# Patient Record
Sex: FEMALE | Race: WHITE | NOT HISPANIC OR LATINO | Employment: FULL TIME | URBAN - METROPOLITAN AREA
[De-identification: names, ages, dates, MRNs, and addresses within clinical notes are randomized per-mention and may not be internally consistent; named-entity substitution may affect disease eponyms.]

---
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
## 2020-01-28 ENCOUNTER — HOSPITAL ENCOUNTER (OUTPATIENT)
Dept: RADIOLOGY | Facility: MEDICAL CENTER | Age: 20
End: 2020-01-28
Attending: NURSE PRACTITIONER
Payer: COMMERCIAL

## 2020-01-28 ENCOUNTER — OFFICE VISIT (OUTPATIENT)
Dept: URGENT CARE | Facility: PHYSICIAN GROUP | Age: 20
End: 2020-01-28
Payer: COMMERCIAL

## 2020-01-28 VITALS
RESPIRATION RATE: 12 BRPM | BODY MASS INDEX: 33.21 KG/M2 | HEIGHT: 70 IN | OXYGEN SATURATION: 97 % | TEMPERATURE: 98.3 F | SYSTOLIC BLOOD PRESSURE: 94 MMHG | HEART RATE: 80 BPM | DIASTOLIC BLOOD PRESSURE: 66 MMHG | WEIGHT: 232 LBS

## 2020-01-28 DIAGNOSIS — N20.0 KIDNEY STONE: ICD-10-CM

## 2020-01-28 DIAGNOSIS — R30.0 DYSURIA: ICD-10-CM

## 2020-01-28 DIAGNOSIS — R31.9 HEMATURIA, UNSPECIFIED TYPE: ICD-10-CM

## 2020-01-28 LAB
APPEARANCE UR: NORMAL
BILIRUB UR STRIP-MCNC: NORMAL MG/DL
COLOR UR AUTO: NORMAL
GLUCOSE UR STRIP.AUTO-MCNC: NEGATIVE MG/DL
INT CON NEG: NEGATIVE
INT CON POS: POSITIVE
KETONES UR STRIP.AUTO-MCNC: NORMAL MG/DL
LEUKOCYTE ESTERASE UR QL STRIP.AUTO: NEGATIVE
NITRITE UR QL STRIP.AUTO: NEGATIVE
PH UR STRIP.AUTO: 5.5 [PH] (ref 5–8)
POC URINE PREGNANCY TEST: NEGATIVE
PROT UR QL STRIP: 300 MG/DL
RBC UR QL AUTO: NORMAL
SP GR UR STRIP.AUTO: 1.03
UROBILINOGEN UR STRIP-MCNC: 2 MG/DL

## 2020-01-28 PROCEDURE — 74176 CT ABD & PELVIS W/O CONTRAST: CPT

## 2020-01-28 PROCEDURE — 81025 URINE PREGNANCY TEST: CPT | Performed by: NURSE PRACTITIONER

## 2020-01-28 PROCEDURE — 81002 URINALYSIS NONAUTO W/O SCOPE: CPT | Performed by: NURSE PRACTITIONER

## 2020-01-28 PROCEDURE — 99214 OFFICE O/P EST MOD 30 MIN: CPT | Performed by: NURSE PRACTITIONER

## 2020-01-28 RX ORDER — TAMSULOSIN HYDROCHLORIDE 0.4 MG/1
0.4 CAPSULE ORAL
Qty: 14 CAP | Refills: 0 | Status: SHIPPED | OUTPATIENT
Start: 2020-01-28 | End: 2021-01-03

## 2020-01-28 RX ORDER — IBUPROFEN 600 MG/1
600 TABLET ORAL EVERY 6 HOURS PRN
COMMUNITY
End: 2020-01-28

## 2020-01-28 RX ORDER — KETOROLAC TROMETHAMINE 30 MG/ML
30 INJECTION, SOLUTION INTRAMUSCULAR; INTRAVENOUS ONCE
Status: COMPLETED | OUTPATIENT
Start: 2020-01-28 | End: 2020-01-28

## 2020-01-28 RX ADMIN — KETOROLAC TROMETHAMINE 30 MG: 30 INJECTION, SOLUTION INTRAMUSCULAR; INTRAVENOUS at 17:02

## 2020-01-28 NOTE — LETTER
January 28, 2020       Patient: Elizabeth Delgado   YOB: 2000   Date of Visit: 1/28/2020         To Whom It May Concern:    It is my medical opinion that Elizabeth Delgado return to full duty, no restrictions..              Sincerely,          DUDLEY Maradiaga  Electronically Signed

## 2020-01-28 NOTE — PROGRESS NOTES
"Subjective:      Elizabeth Delgado is a 19 y.o. female who presents with Vaginal Pain (color change in urine, vaginal pain, blood in urine, dysuria, bloating, loss of appetite, weakness, vomiting yesterday, concerned for tubal pregnancy)    Reviewed past medical, surgical and family history. Reviewed prescription and OTC medications with patient in electronic health record today      No Known Allergies          HPI This is a new problem.  C/o abd discomfort and bloating.   No brith control for awhile ( high dose OCP) . 1 year without period. Last menstrual cycle 1- to 1-. Then felt like she had UTI around 1-24-20. Increased bloating and lower abd discomfort. Assoc Sx: weakness, fatigue, dysuria, vomited once yesterday.   Treatment tried; increased fluids, cranberry juice. No other aggravating or alleviating factors.       Review of Systems   Constitutional: Negative for chills, fever and malaise/fatigue.   Respiratory: Negative for cough.    Gastrointestinal: Negative for abdominal pain, constipation, diarrhea and vomiting.        See HPI     Genitourinary: Negative for dysuria, flank pain, frequency, hematuria and urgency.   Musculoskeletal: Negative for back pain.   Neurological: Negative for tingling and weakness.   Psychiatric/Behavioral: Negative for substance abuse.          Objective:     BP (!) 94/66   Pulse 80   Temp 36.8 °C (98.3 °F)   Resp 12   Ht 1.778 m (5' 10\")   Wt 105.2 kg (232 lb)   LMP 01/22/2020 (Exact Date)   SpO2 97%   Breastfeeding? Unknown   BMI 33.29 kg/m²      Physical Exam  Vitals signs and nursing note reviewed.   Constitutional:       General: She is not in acute distress.     Appearance: Normal appearance. She is well-developed. She is not ill-appearing, toxic-appearing or diaphoretic.   HENT:      Head: Normocephalic.      Mouth/Throat:      Mouth: Mucous membranes are moist.   Eyes:      Pupils: Pupils are equal, round, and reactive to light.   Cardiovascular:     "  Rate and Rhythm: Normal rate and regular rhythm.   Pulmonary:      Effort: Pulmonary effort is normal.   Abdominal:      General: Bowel sounds are normal. There is no distension.      Palpations: Abdomen is soft. Abdomen is not rigid.      Tenderness: There is generalized tenderness. There is no right CVA tenderness, left CVA tenderness, guarding or rebound. Negative signs include Henderson's sign, Rovsing's sign, McBurney's sign, psoas sign and obturator sign.   Musculoskeletal:      Lumbar back: Normal.   Skin:     General: Skin is warm and dry.      Capillary Refill: Capillary refill takes less than 2 seconds.   Neurological:      General: No focal deficit present.      Mental Status: She is alert and oriented to person, place, and time.   Psychiatric:         Mood and Affect: Mood normal.         Behavior: Behavior normal. Behavior is cooperative.         Thought Content: Thought content normal.                1/28/2020 4:04 PM     HISTORY/REASON FOR EXAM:  Hematuria and dysuria for 2 months.     TECHNIQUE/EXAM DESCRIPTION AND NUMBER OF VIEWS: CT scan renal/colic without contrast.     5 mm helical images of the abdomen and pelvis were obtained from the diaphragmatic domes through the pubic symphysis.     Low dose optimization technique was utilized for this CT exam including automated exposure control and adjustment of the mA and/or kV according to patient size.     COMPARISON:     FINDINGS:    The visualized lung bases are clear.     The liver, spleen, pancreas, and adrenal glands are normal in noncontrast appearance.  The gallbladder is normal.  Both kidneys are without hydronephrosis. There is a single 1 mm punctate stone in the lower pole of the left kidney.  No ureteral dilatation or ureterolithiasis is identified.  No perinephric fluid collection is present.  No adrenal enlargement is present.  The aorta and IVC are normal in caliber.     The bowel and mesentery are normal.  The appendix appears  normal.     No free fluid or fluid collections are present.     There is no abdominal or pelvic adenopathy.     Evaluation of the pelvis shows no mass, inflammation, or other significant abnormalities.  The bladder is minimally distended.  The uterus and ovaries appear normal.     Please note that noncontrast evaluation will not detect many abnormalities of the solid organs, bowel, or peritoneal cavity.     IMPRESSION:        1.  Single 1 mm stone in the lower pole the left kidney. No right nephrolithiasis.     2.  No hydronephrosis or ureterolithiasis.     3.  No inflammatory change of the kidneys identified.     4.  No evidence of bowel obstruction or inflammation.             Last Resulted: 01/28/20  4:28 PM               Assessment/Plan:     1. Dysuria  POCT Urinalysis    POCT Pregnancy    CT-RENAL COLIC EVALUATION(A/P W/O)   2. Hematuria, unspecified type  CT-RENAL COLIC EVALUATION(A/P W/O)   3. Kidney stone  ketorolac (TORADOL) injection 30 mg    tamsulosin (FLOMAX) 0.4 MG capsule     Return to urgent care clinic or PCP 4-5  days if current symptoms are not resolving in a satisfactory manner or sooner if new or worsening symptoms occur. Differential diagnosis, natural history, supportive care, and indications for immediate follow-up discussed at length.   Advised of signs and symptoms which would warrant further evaluation and /or emergent evaluation in ER.    Verbalized agreement with this treatment plan and seemed to understand without barriers. Questions were encouraged and answered to satisfaction.     Keep well hydrated    OTC  analgesic of choice. Follow manufactures dosing and safety precautions.   Educated in proper administration of medication(s) ordered today including safety, possible SE, risks, benefits, rationale and alternatives to therapy.

## 2020-01-29 NOTE — PATIENT INSTRUCTIONS
Kidney Stones  Kidney stones (urolithiasis) are solid, rock-like deposits that form inside of the organs that make urine (kidneys). A kidney stone may form in a kidney and move into the bladder, where it can cause intense pain and block the flow of urine. Kidney stones are created when high levels of certain minerals are found in the urine. They are usually passed through urination, but in some cases, medical treatment may be needed to remove them.  What are the causes?  Kidney stones may be caused by:  · A condition in which certain glands produce too much parathyroid hormone (primary hyperparathyroidism), which causes too much calcium buildup in the blood.  · Buildup of uric acid crystals in the bladder (hyperuricosuria). Uric acid is a chemical that the body produces when you eat certain foods. It usually exits the body in the urine.  · Narrowing (stricture) of one or both of the tubes that drain urine from the kidneys to the bladder (ureters).  · A kidney blockage that is present at birth (congenital obstruction).  · Past surgery on the kidney or the ureters, such as gastric bypass surgery.  What increases the risk?  The following factors make you more likely to develop kidney stones:  · Having had a kidney stone in the past.  · Having a family history of kidney stones.  · Not drinking enough water.  · Eating a diet that is high in protein, salt (sodium), or sugar.  · Being overweight or obese.  What are the signs or symptoms?  Symptoms of a kidney stone may include:  · Nausea.  · Vomiting.  · Blood in the urine (hematuria).  · Pain in the side of the abdomen, right below the ribs (flank pain). Pain usually spreads (radiates) to the groin.  · Needing to urinate frequently or urgently.  How is this diagnosed?  This condition may be diagnosed based on:  · Your medical history.  · A physical exam.  · Blood tests.  · Urine tests.  · CT scan.  · Abdominal X-ray.  · A procedure to examine the inside of the bladder  (cystoscopy).  How is this treated?  Treatment for kidney stones depends on the size, location, and makeup of the stones. Treatment may involve:  · Analyzing your urine before and after you pass the stone through urination.  · Being monitored at the hospital until you pass the stone through urination.  · Increasing your fluid intake and decreasing the amount of calcium and protein in your diet.  · A procedure to break up kidney stones in the bladder using:  ¨ A focused beam of light (laser therapy).  ¨ Shock waves (extracorporeal shock wave lithotripsy).  · Surgery to remove kidney stones. This may be needed if you have severe pain or have stones that block your urinary tract.  Follow these instructions at home:  Eating and drinking  · Drink enough fluid to keep your urine clear or pale yellow. This will help you to pass the kidney stone.  · If directed, change your diet. This may include:  ¨ Limiting how much sodium you eat.  ¨ Eating more fruits and vegetables.  ¨ Limiting how much meat, poultry, fish, and eggs you eat.  · Follow instructions from your health care provider about eating or drinking restrictions.  General instructions  · Collect urine samples as told by your health care provider. You may need to collect a urine sample:  ¨ 24 hours after you pass the stone.  ¨ 8-12 weeks after passing the kidney stone, and every 6-12 months after that.  · Strain your urine every time you urinate, for as long as directed. Use the strainer that your health care provider recommends.  · Do not throw out the kidney stone after passing it. Keep the stone so it can be tested by your health care provider. Testing the makeup of your kidney stone may help prevent you from getting kidney stones in the future.  · Take over-the-counter and prescription medicines only as told by your health care provider.  · Keep all follow-up visits as told by your health care provider. This is important. You may need follow-up X-rays or  ultrasounds to make sure that your stone has passed.  How is this prevented?  To prevent another kidney stone:  · Drink enough fluid to keep your urine clear or pale yellow. This is the best way to prevent kidney stones.  · Eat a healthy diet and follow recommendations from your health care provider about foods to avoid. You may be instructed to eat a low-protein diet. Recommendations vary depending on the type of kidney stone that you have.  · Maintain a healthy weight.  Contact a health care provider if:  · You have pain that gets worse or does not get better with medicine.  Get help right away if:  · You have a fever or chills.  · You develop severe pain.  · You develop new abdominal pain.  · You faint.  · You are unable to urinate.  This information is not intended to replace advice given to you by your health care provider. Make sure you discuss any questions you have with your health care provider.  Document Released: 12/18/2006 Document Revised: 07/07/2017 Document Reviewed: 06/02/2017  [employer] Interactive Patient Education © 2017 [employer]

## 2020-02-04 ASSESSMENT — ENCOUNTER SYMPTOMS
VOMITING: 0
BACK PAIN: 0
TINGLING: 0
FEVER: 0
CHILLS: 0
FLANK PAIN: 0
ABDOMINAL PAIN: 0
DIARRHEA: 0
WEAKNESS: 0
CONSTIPATION: 0
ROS GI COMMENTS: SEE HPI
COUGH: 0

## 2020-02-04 ASSESSMENT — LIFESTYLE VARIABLES: SUBSTANCE_ABUSE: 0

## 2020-04-24 ENCOUNTER — OFFICE VISIT (OUTPATIENT)
Dept: URGENT CARE | Facility: PHYSICIAN GROUP | Age: 20
End: 2020-04-24
Payer: COMMERCIAL

## 2020-04-24 ENCOUNTER — HOSPITAL ENCOUNTER (OUTPATIENT)
Facility: MEDICAL CENTER | Age: 20
End: 2020-04-24
Attending: NURSE PRACTITIONER
Payer: COMMERCIAL

## 2020-04-24 ENCOUNTER — TELEPHONE (OUTPATIENT)
Dept: URGENT CARE | Facility: PHYSICIAN GROUP | Age: 20
End: 2020-04-24

## 2020-04-24 VITALS
HEIGHT: 70 IN | OXYGEN SATURATION: 100 % | DIASTOLIC BLOOD PRESSURE: 62 MMHG | WEIGHT: 220 LBS | TEMPERATURE: 98 F | BODY MASS INDEX: 31.5 KG/M2 | HEART RATE: 85 BPM | RESPIRATION RATE: 14 BRPM | SYSTOLIC BLOOD PRESSURE: 130 MMHG

## 2020-04-24 DIAGNOSIS — Z72.51 HIGH RISK HETEROSEXUAL BEHAVIOR: ICD-10-CM

## 2020-04-24 DIAGNOSIS — A54.9 GONORRHEA: ICD-10-CM

## 2020-04-24 DIAGNOSIS — Z20.2 EXPOSURE TO CHLAMYDIA: ICD-10-CM

## 2020-04-24 LAB
APPEARANCE UR: CLEAR
BILIRUB UR STRIP-MCNC: NEGATIVE MG/DL
COLOR UR AUTO: YELLOW
GLUCOSE UR STRIP.AUTO-MCNC: NEGATIVE MG/DL
KETONES UR STRIP.AUTO-MCNC: NEGATIVE MG/DL
LEUKOCYTE ESTERASE UR QL STRIP.AUTO: NEGATIVE
NITRITE UR QL STRIP.AUTO: NEGATIVE
PH UR STRIP.AUTO: 7 [PH] (ref 5–8)
PROT UR QL STRIP: NORMAL MG/DL
RBC UR QL AUTO: NORMAL
SP GR UR STRIP.AUTO: 1.02
UROBILINOGEN UR STRIP-MCNC: 4 MG/DL

## 2020-04-24 PROCEDURE — 87591 N.GONORRHOEAE DNA AMP PROB: CPT

## 2020-04-24 PROCEDURE — 99214 OFFICE O/P EST MOD 30 MIN: CPT | Performed by: NURSE PRACTITIONER

## 2020-04-24 PROCEDURE — 87491 CHLMYD TRACH DNA AMP PROBE: CPT

## 2020-04-24 PROCEDURE — 81002 URINALYSIS NONAUTO W/O SCOPE: CPT | Performed by: NURSE PRACTITIONER

## 2020-04-24 RX ORDER — AZITHROMYCIN 500 MG/1
1000 TABLET, FILM COATED ORAL ONCE
Qty: 2 TAB | Refills: 0 | Status: SHIPPED | OUTPATIENT
Start: 2020-04-24 | End: 2020-04-24

## 2020-04-24 ASSESSMENT — ENCOUNTER SYMPTOMS
MYALGIAS: 0
CHILLS: 0
BACK PAIN: 0
NAUSEA: 1
ABDOMINAL PAIN: 0
FLANK PAIN: 1
FEVER: 0

## 2020-04-24 NOTE — PROGRESS NOTES
Subjective:      Elizabeth Delgado is a 19 y.o. female who presents with Exposure to STD (partner tested positive for chlamydia )            HPI New. 19 year old female with exposure to chlamydia through her partner. She denies urinary symptoms, abdominal pain, back pain or fever/chills. She has no vaginal discharge that is concerning. States partner informed her of his positive test.  Patient has no known allergies.  Current Outpatient Medications on File Prior to Visit   Medication Sig Dispense Refill   • tamsulosin (FLOMAX) 0.4 MG capsule Take 1 Cap by mouth ONE-HALF HOUR AFTER BREAKFAST. (Patient not taking: Reported on 4/24/2020) 14 Cap 0     No current facility-administered medications on file prior to visit.      Social History     Socioeconomic History   • Marital status: Single     Spouse name: Not on file   • Number of children: Not on file   • Years of education: Not on file   • Highest education level: Not on file   Occupational History   • Not on file   Social Needs   • Financial resource strain: Not on file   • Food insecurity     Worry: Not on file     Inability: Not on file   • Transportation needs     Medical: Not on file     Non-medical: Not on file   Tobacco Use   • Smoking status: Former Smoker     Packs/day: 0.00   • Smokeless tobacco: Former User     Types: Chew   • Tobacco comment: vapes   Substance and Sexual Activity   • Alcohol use: No   • Drug use: Not Currently     Types: Marijuana   • Sexual activity: Yes     Partners: Male   Lifestyle   • Physical activity     Days per week: Not on file     Minutes per session: Not on file   • Stress: Not on file   Relationships   • Social connections     Talks on phone: Not on file     Gets together: Not on file     Attends [employer] service: Not on file     Active member of club or organization: Not on file     Attends meetings of clubs or organizations: Not on file     Relationship status: Not on file   • Intimate partner violence     Fear of current  "or ex partner: Not on file     Emotionally abused: Not on file     Physically abused: Not on file     Forced sexual activity: Not on file   Other Topics Concern   • Behavioral problems Not Asked   • Interpersonal relationships Not Asked   • Sad or not enjoying activities Not Asked   • Suicidal thoughts Not Asked   • Poor school performance Not Asked   • Reading difficulties Not Asked   • Speech difficulties Not Asked   • Writing difficulties Not Asked   • Inadequate sleep Not Asked   • Excessive TV viewing Not Asked   • Excessive video game use Not Asked   • Inadequate exercise Not Asked   • Sports related Not Asked   • Poor diet Not Asked   • Family concerns for drug/alcohol abuse Not Asked   • Poor oral hygiene Not Asked   • Bike safety Not Asked   • Family concerns vehicle safety Not Asked   Social History Narrative   • Not on file     Breast Cancer-related family history is not on file.      Review of Systems   Constitutional: Negative for chills and fever.   Gastrointestinal: Positive for nausea. Negative for abdominal pain.   Genitourinary: Positive for flank pain. Negative for dysuria, frequency, hematuria and urgency.        Denies unusual vaginal discharge.   Musculoskeletal: Negative for back pain and myalgias.          Objective:     /62 (BP Location: Left arm, Patient Position: Sitting, BP Cuff Size: Adult)   Pulse 85   Temp 36.7 °C (98 °F) (Temporal)   Resp 14   Ht 1.778 m (5' 10\")   Wt 99.8 kg (220 lb)   SpO2 100%   BMI 31.57 kg/m²      Physical Exam  Vitals signs reviewed.   Constitutional:       General: She is not in acute distress.     Appearance: She is well-developed.   Cardiovascular:      Rate and Rhythm: Normal rate and regular rhythm.      Heart sounds: Normal heart sounds. No murmur.   Pulmonary:      Effort: Pulmonary effort is normal. No respiratory distress.      Breath sounds: Normal breath sounds.   Abdominal:      General: Bowel sounds are normal.      Palpations: Abdomen " is soft.      Tenderness: There is no abdominal tenderness. There is no right CVA tenderness or left CVA tenderness.   Musculoskeletal: Normal range of motion.      Comments: Moves all 4 extremities normally   Skin:     General: Skin is warm and dry.   Neurological:      Mental Status: She is alert and oriented to person, place, and time.   Psychiatric:         Behavior: Behavior normal.         Thought Content: Thought content normal.                 Assessment/Plan:       1. Exposure to chlamydia  POCT Urinalysis    CHLAMYDIA/GC PCR URINE OR SWAB    azithromycin (ZITHROMAX) 500 MG tablet   2. High risk heterosexual behavior       Will call with results.  Verified phone number with patient.  Azithromycin x one dose.  Differential diagnosis, natural history, supportive care, and indications for immediate follow-up discussed at length.

## 2020-04-25 LAB
C TRACH DNA SPEC QL NAA+PROBE: NEGATIVE
N GONORRHOEA DNA SPEC QL NAA+PROBE: POSITIVE
SPECIMEN SOURCE: ABNORMAL

## 2020-04-26 ENCOUNTER — TELEPHONE (OUTPATIENT)
Dept: URGENT CARE | Facility: CLINIC | Age: 20
End: 2020-04-26

## 2020-04-26 ENCOUNTER — TELEPHONE (OUTPATIENT)
Dept: URGENT CARE | Facility: PHYSICIAN GROUP | Age: 20
End: 2020-04-26

## 2020-07-15 ENCOUNTER — OFFICE VISIT (OUTPATIENT)
Dept: URGENT CARE | Facility: PHYSICIAN GROUP | Age: 20
End: 2020-07-15
Payer: COMMERCIAL

## 2020-07-15 ENCOUNTER — HOSPITAL ENCOUNTER (OUTPATIENT)
Facility: MEDICAL CENTER | Age: 20
End: 2020-07-15
Attending: FAMILY MEDICINE
Payer: COMMERCIAL

## 2020-07-15 VITALS
OXYGEN SATURATION: 94 % | WEIGHT: 215 LBS | RESPIRATION RATE: 15 BRPM | SYSTOLIC BLOOD PRESSURE: 118 MMHG | HEIGHT: 70 IN | DIASTOLIC BLOOD PRESSURE: 80 MMHG | HEART RATE: 80 BPM | TEMPERATURE: 97.7 F | BODY MASS INDEX: 30.78 KG/M2

## 2020-07-15 DIAGNOSIS — N30.01 ACUTE CYSTITIS WITH HEMATURIA: ICD-10-CM

## 2020-07-15 DIAGNOSIS — R30.0 DYSURIA: ICD-10-CM

## 2020-07-15 DIAGNOSIS — Z72.51 HIGH RISK HETEROSEXUAL BEHAVIOR: ICD-10-CM

## 2020-07-15 LAB
APPEARANCE UR: CLEAR
BILIRUB UR STRIP-MCNC: NEGATIVE MG/DL
COLOR UR AUTO: YELLOW
GLUCOSE UR STRIP.AUTO-MCNC: NEGATIVE MG/DL
KETONES UR STRIP.AUTO-MCNC: NEGATIVE MG/DL
LEUKOCYTE ESTERASE UR QL STRIP.AUTO: NORMAL
NITRITE UR QL STRIP.AUTO: POSITIVE
PH UR STRIP.AUTO: 5.5 [PH] (ref 5–8)
PROT UR QL STRIP: NORMAL MG/DL
RBC UR QL AUTO: NORMAL
SP GR UR STRIP.AUTO: 1
UROBILINOGEN UR STRIP-MCNC: 0.2 MG/DL

## 2020-07-15 PROCEDURE — 99214 OFFICE O/P EST MOD 30 MIN: CPT | Performed by: FAMILY MEDICINE

## 2020-07-15 PROCEDURE — 87591 N.GONORRHOEAE DNA AMP PROB: CPT

## 2020-07-15 PROCEDURE — 87491 CHLMYD TRACH DNA AMP PROBE: CPT

## 2020-07-15 PROCEDURE — 81002 URINALYSIS NONAUTO W/O SCOPE: CPT | Performed by: FAMILY MEDICINE

## 2020-07-15 RX ORDER — NITROFURANTOIN 25; 75 MG/1; MG/1
100 CAPSULE ORAL EVERY 12 HOURS
Qty: 10 CAP | Refills: 0 | Status: SHIPPED | OUTPATIENT
Start: 2020-07-15 | End: 2020-07-20

## 2020-07-15 ASSESSMENT — ENCOUNTER SYMPTOMS
MYALGIAS: 0
COUGH: 0
VOMITING: 0
FEVER: 0
SHORTNESS OF BREATH: 0
NAUSEA: 0
FLANK PAIN: 0
DIZZINESS: 0
SORE THROAT: 0
ABDOMINAL PAIN: 0
CHILLS: 0

## 2020-07-15 NOTE — PROGRESS NOTES
Subjective:   Elizabeth Delgado is a 19 y.o. female who presents for Dysuria (burning, frequency, requesting STI testing, sx since last week)        Dysuria    This is a new problem. The current episode started in the past 7 days. The problem occurs every urination. The problem has been unchanged. There has been no fever. She is sexually active (recent unprotected intercourse within last 90 days, patient is requesting testing for STD). There is no history of pyelonephritis. Associated symptoms include frequency, hesitancy and urgency. Pertinent negatives include no chills, discharge, flank pain, nausea, possible pregnancy or vomiting. She has tried increased fluids for the symptoms. The treatment provided no relief.     PMH:  has no past medical history on file.  MEDS:   Current Outpatient Medications:   •  Phenazopyridine HCl (AZO TABS PO), Take  by mouth., Disp: , Rfl:   •  nitrofurantoin (MACROBID) 100 MG Cap, Take 1 Cap by mouth every 12 hours for 5 days., Disp: 10 Cap, Rfl: 0  •  tamsulosin (FLOMAX) 0.4 MG capsule, Take 1 Cap by mouth ONE-HALF HOUR AFTER BREAKFAST. (Patient not taking: Reported on 4/24/2020), Disp: 14 Cap, Rfl: 0  ALLERGIES: No Known Allergies  SURGHX: No past surgical history on file.  SOCHX:  reports that she has quit smoking. She smoked 0.00 packs per day. She has quit using smokeless tobacco.  Her smokeless tobacco use included chew. She reports previous drug use. Drug: Marijuana. She reports that she does not drink alcohol.  FH:   Family History   Problem Relation Age of Onset   • Hypertension Mother      Review of Systems   Constitutional: Negative for chills and fever.   HENT: Negative for sore throat.    Respiratory: Negative for cough and shortness of breath.    Gastrointestinal: Negative for abdominal pain, nausea and vomiting.   Genitourinary: Positive for dysuria, frequency, hesitancy and urgency. Negative for flank pain.   Musculoskeletal: Negative for myalgias.   Skin: Negative for  "rash.   Neurological: Negative for dizziness.        Objective:   /80   Pulse 80   Temp 36.5 °C (97.7 °F)   Resp 15   Ht 1.778 m (5' 10\")   Wt 97.5 kg (215 lb)   SpO2 94%   BMI 30.85 kg/m²   Physical Exam  Vitals signs and nursing note reviewed.   Constitutional:       General: She is not in acute distress.     Appearance: She is well-developed.   HENT:      Head: Normocephalic and atraumatic.      Right Ear: External ear normal.      Left Ear: External ear normal.      Nose: Nose normal.      Mouth/Throat:      Mouth: Mucous membranes are moist.   Eyes:      Conjunctiva/sclera: Conjunctivae normal.   Cardiovascular:      Rate and Rhythm: Normal rate.   Pulmonary:      Effort: Pulmonary effort is normal. No respiratory distress.      Breath sounds: Normal breath sounds.   Abdominal:      General: There is no distension.   Musculoskeletal: Normal range of motion.   Skin:     General: Skin is warm and dry.   Neurological:      General: No focal deficit present.      Mental Status: She is alert and oriented to person, place, and time. Mental status is at baseline.      Gait: Gait (gait at baseline) normal.   Psychiatric:         Judgment: Judgment normal.     Urine BHCG: negative    POC UA: nitrite positive, leukocyte esterace positive      Assessment/Plan:   1. Acute cystitis with hematuria  - POCT Urinalysis  - nitrofurantoin (MACROBID) 100 MG Cap; Take 1 Cap by mouth every 12 hours for 5 days.  Dispense: 10 Cap; Refill: 0    2. High risk heterosexual behavior  - CHLAMYDIA/GC PCR URINE OR SWAB; Future    3. Dysuria  - CHLAMYDIA/GC PCR URINE OR SWAB; Future    Other orders  - Phenazopyridine HCl (AZO TABS PO); Take  by mouth.      Discussed close monitoring, return precautions, and supportive measures of maintaining adequate fluid hydration and caloric intake, relative rest and symptom management as needed for pain and/or fever.    Differential diagnosis, natural history, supportive care, and indications " for immediate follow-up discussed.     Advised the patient to follow-up with the primary care physician for recheck, reevaluation, and consideration of further management.    Please note that this dictation was created using voice recognition software. I have worked with consultants from the vendor as well as technical experts from [facility] to optimize the interface. I have made every reasonable attempt to correct obvious errors, but I expect that there are errors of grammar and possibly content that I did not discover before finalizing the note.

## 2020-07-18 NOTE — RESULT ENCOUNTER NOTE
Please call patient and let them know the gonorrhea and Chlamydia testing were both negative.  Advised patient to follow-up with primary care provider for any persistent or worsening symptoms.

## 2020-07-20 ENCOUNTER — TELEPHONE (OUTPATIENT)
Dept: URGENT CARE | Facility: PHYSICIAN GROUP | Age: 20
End: 2020-07-20

## 2020-07-21 NOTE — TELEPHONE ENCOUNTER
----- Message from Radhames Sow M.D. sent at 7/18/2020  9:26 AM PDT -----  Please call patient and let them know the gonorrhea and Chlamydia testing were both negative.  Advised patient to follow-up with primary care provider for any persistent or worsening symptoms.

## 2020-07-22 ENCOUNTER — TELEPHONE (OUTPATIENT)
Dept: URGENT CARE | Facility: PHYSICIAN GROUP | Age: 20
End: 2020-07-22

## 2020-12-06 ENCOUNTER — OFFICE VISIT (OUTPATIENT)
Dept: URGENT CARE | Facility: PHYSICIAN GROUP | Age: 20
End: 2020-12-06
Payer: COMMERCIAL

## 2020-12-06 VITALS
WEIGHT: 247 LBS | HEART RATE: 80 BPM | BODY MASS INDEX: 35.36 KG/M2 | HEIGHT: 70 IN | TEMPERATURE: 97.9 F | SYSTOLIC BLOOD PRESSURE: 126 MMHG | RESPIRATION RATE: 16 BRPM | OXYGEN SATURATION: 97 % | DIASTOLIC BLOOD PRESSURE: 70 MMHG

## 2020-12-06 DIAGNOSIS — M72.2 PLANTAR FASCIITIS OF RIGHT FOOT: ICD-10-CM

## 2020-12-06 DIAGNOSIS — M76.821 POSTERIOR TIBIAL TENDINITIS, RIGHT: ICD-10-CM

## 2020-12-06 PROCEDURE — 99214 OFFICE O/P EST MOD 30 MIN: CPT | Performed by: PHYSICIAN ASSISTANT

## 2020-12-06 RX ORDER — NAPROXEN 375 MG/1
375 TABLET ORAL
Qty: 45 TAB | Refills: 0 | Status: SHIPPED | OUTPATIENT
Start: 2020-12-06 | End: 2022-01-15

## 2020-12-06 ASSESSMENT — ENCOUNTER SYMPTOMS
SENSORY CHANGE: 0
TINGLING: 0

## 2020-12-06 NOTE — LETTER
December 6, 2020         Patient: Elizabeth Delgado   YOB: 2000   Date of Visit: 12/6/2020           To Whom it May Concern:    Elizabeth Delgado was seen in my clinic on 12/6/2020. She may return to work on 12/7/20 at full duty with no restrictions.    If you have any questions or concerns, please don't hesitate to call.        Sincerely,           Ana Avitia P.A.-C.  Electronically Signed

## 2020-12-07 NOTE — PROGRESS NOTES
"Subjective:   Elizabeth Delgado  is a 20 y.o. female who presents for Foot Problem (R foot , arch feels pulled muscle/tendon, pain while walking  x1wk)      This is a new problem.  Patient presents to urgent care with 1 week history of severe pain in the plantar aspect of the right foot medially.  Patient reports pain is worse after walking and weightbearing and better with rest.  However, she does have pain when she first gets out of bed in the morning as well however this is not rated at severe.  Patient has been taking over-the-counter anti-inflammatory medication with no relief of symptoms.  Patient denies any recent falls or injuries.  She does work at [employer] and does a lot of standing throughout the day.      Foot Problem      Review of Systems   Musculoskeletal:        Right foot pain   Neurological: Negative for tingling and sensory change.   All other systems reviewed and are negative.    No Known Allergies  Reviewed past medical, surgical , social and family history.  Reviewed prescription and over-the-counter medications with patient and electronic health record today.     Objective:   /70   Pulse 80   Temp 36.6 °C (97.9 °F) (Temporal)   Resp 16   Ht 1.778 m (5' 10\")   Wt 112 kg (247 lb)   SpO2 97%   BMI 35.44 kg/m²   Physical Exam  Vitals signs reviewed.   Constitutional:       Appearance: She is well-developed.   HENT:      Head: Normocephalic and atraumatic.      Right Ear: External ear normal.      Left Ear: External ear normal.      Nose: Nose normal.      Mouth/Throat:      Mouth: Mucous membranes are moist.   Eyes:      Extraocular Movements: Extraocular movements intact.      Conjunctiva/sclera: Conjunctivae normal.      Pupils: Pupils are equal, round, and reactive to light.   Neck:      Musculoskeletal: Normal range of motion and neck supple.   Cardiovascular:      Rate and Rhythm: Normal rate and regular rhythm.      Heart sounds: Normal heart sounds.   Pulmonary:      Effort: " Pulmonary effort is normal.      Breath sounds: Normal breath sounds.   Musculoskeletal: Normal range of motion.      Right ankle: She exhibits normal range of motion, no swelling and no ecchymosis. Tenderness. Achilles tendon normal.        Feet:       Comments: Right foot/ankle with tenderness along the plantar fascia more medially and tenderness of the plantar fascia with stretching.  Patient also has tenderness along the posterior tibial tendon on the right.  Distal neurovascular intact.   Lymphadenopathy:      Cervical: No cervical adenopathy.   Skin:     General: Skin is warm and dry.      Findings: No rash.   Neurological:      Mental Status: She is alert and oriented to person, place, and time.      Cranial Nerves: Cranial nerves are intact.      Sensory: Sensation is intact.      Motor: Motor function is intact.      Coordination: Coordination is intact.   Psychiatric:         Attention and Perception: Attention normal.         Mood and Affect: Mood normal.         Speech: Speech normal.         Behavior: Behavior normal.         Thought Content: Thought content normal.         Judgment: Judgment normal.           Assessment/Plan:   1. Plantar fasciitis of right foot  - naproxen (NAPROSYN) 375 MG Tab; Take 1 Tab by mouth 3 times a day, with meals.  Dispense: 45 Tab; Refill: 0  - REFERRAL TO SPORTS MEDICINE    2. Posterior tibial tendinitis, right  - naproxen (NAPROSYN) 375 MG Tab; Take 1 Tab by mouth 3 times a day, with meals.  Dispense: 45 Tab; Refill: 0  - REFERRAL TO SPORTS MEDICINE    Patient reports that the shoes she is currently wearing are what she wears at work.  These are examined and reveal no arch support and significant flexibility.  Patient is encouraged to consider more supportive shoes and an orthotic arch support which she may purchase through a [employer] store or shoe store.    Patient is given naproxen and counseled on not taking any additional NSAIDs while taking this  medication.  She may use Tylenol for breakthrough pain.    Referral placed to sports medicine for further evaluation and management.    Upon entering exam room I ensured patient was wearing a mask.  This provider wore appropriate PPE throughout entire visit.  Patient wore mask entire visit except for a brief period while examining oropharynx.    Differential diagnosis, natural history, supportive care, and indications for immediate follow-up discussed.     Red flag warning symptoms and strict ER/follow-up precautions given.  The patient demonstrated a good understanding and agreed with the treatment plan.  Please note that this note was created using voice recognition speech to text software. Every effort has been made to correct obvious errors.  However, I expect there are errors of grammar and possibly context that were not discovered prior to finalizing the note  PAUL Avitia PA-C

## 2020-12-07 NOTE — PATIENT INSTRUCTIONS
Tendinitis    Tendinitis is swelling (inflammation) of a tendon. A tendon is a cord of tissue that connects muscle to bone. Tendinitis can cause pain, tenderness, and swelling.  What are the causes?  · Using a tendon or muscle too much (overuse). This is a common cause.  · Wear and tear that happens as you age.  · Injury.  · Some medical conditions, such as arthritis.  · Some medicines.  What increases the risk?  You are more likely to get this condition if you do activities that involve the same movements over and over again (repetitive motions).  What are the signs or symptoms?  · Pain.  · Tenderness.  · Mild swelling.  · Decreased range of motion.  How is this treated?  This condition is usually treated with RICE therapy. RICE stands for:  · Rest.  · Ice.  · Compression. This means putting pressure on the affected area.  · Elevation. This means raising the affected area above the level of your heart.  Treatment may also include:  · Medicines for swelling or pain.  · Exercises or physical therapy.  · A brace or splint.  · Surgery. This is rarely needed.  Follow these instructions at home:  If you have a splint or brace:  · Wear the splint or brace as told by your doctor. Remove it only as told by your doctor.  · Loosen the splint or brace if your fingers or toes:  ? Tingle.  ? Become numb.  ? Turn cold and blue.  · Keep the splint or brace clean.  · If the splint or brace is not waterproof:  ? Do not let it get wet.  ? Cover it with a watertight covering when you take a bath or shower.  Managing pain, stiffness, and swelling         · If told, put ice on the affected area.  ? If you have a removable splint or brace, remove it as told by your doctor.  ? Put ice in a plastic bag.  ? Place a towel between your skin and the bag.  ? Leave the ice on for 20 minutes, 2-3 times a day.  · Move the fingers or toes of the affected arm or leg often, if this applies. This helps to prevent stiffness and to lessen  swelling.  · If told, raise the affected area above the level of your heart while you are sitting or lying down.  · If told, put heat on the affected area before you exercise. Use the heat source that your doctor recommends, such as a moist heat pack or a heating pad.  ? Place a towel between your skin and the heat source.  ? Leave the heat on for 20-30 minutes.  ? Remove the heat if your skin turns bright red. This is very important if you are unable to feel pain, heat, or cold. You may have a greater risk of getting burned.  Driving  · Do not drive or use heavy machinery while taking prescription pain medicine.  · Ask your doctor when it is safe to drive if you have a splint or brace on any part of your arm or leg.  Activity  · Rest the affected area as told by your doctor.  · Return to your normal activities as told by your doctor. Ask your doctor what activities are safe for you.  · Avoid using the affected area while you have symptoms.  · Do exercises as told by your doctor.  General instructions  · If you have a splint, do not put pressure on any part of the splint until it is fully hardened. This may take several hours.  · Wear an elastic bandage or pressure (compression) wrap only as told by your doctor.  · Take over-the-counter and prescription medicines only as told by your doctor.  · Keep all follow-up visits as told by your doctor. This is important.  Contact a doctor if:  · You do not get better.  · You get new problems, such as numbness in your hands, and you do not know why.  Summary  · Tendinitis is swelling (inflammation) of a tendon.  · You are more likely to get this condition if you do activities that involve the same movements over and over again.  · This condition is usually treated with RICE therapy. RICE stands for rest, ice, compression, and elevate.  · Avoid using the affected area while you have symptoms.  This information is not intended to replace advice given to you by your health care  provider. Make sure you discuss any questions you have with your health care provider.  Document Released: 03/29/2012 Document Revised: 06/25/2019 Document Reviewed: 05/08/2019  Elsevier Patient Education © 2020 [employer]  Plantar Fasciitis    Plantar fasciitis is a painful foot condition that affects the heel. It occurs when the band of tissue that connects the toes to the heel bone (plantar fascia) becomes irritated. This can happen as the result of exercising too much or doing other repetitive activities (overuse injury).  The pain from plantar fasciitis can range from mild irritation to severe pain that makes it difficult to walk or move. The pain is usually worse in the morning after sleeping, or after sitting or lying down for a while. Pain may also be worse after long periods of walking or standing.  What are the causes?  This condition may be caused by:  · Standing for long periods of time.  · Wearing shoes that do not have good arch support.  · Doing activities that put stress on joints (high-impact activities), including running, aerobics, and ballet.  · Being overweight.  · An abnormal way of walking (gait).  · Tight muscles in the back of your lower leg (calf).  · High arches in your feet.  · Starting a new athletic activity.  What are the signs or symptoms?  The main symptom of this condition is heel pain. Pain may:  · Be worse with first steps after a time of rest, especially in the morning after sleeping or after you have been sitting or lying down for a while.  · Be worse after long periods of standing still.  · Decrease after 30-45 minutes of activity, such as gentle walking.  How is this diagnosed?  This condition may be diagnosed based on your medical history and your symptoms. Your health care provider may ask questions about your activity level. Your health care provider will do a physical exam to check for:  · A tender area on the bottom of your foot.  · A high arch in your foot.  · Pain  when you move your foot.  · Difficulty moving your foot.  You may have imaging tests to confirm the diagnosis, such as:  · X-rays.  · Ultrasound.  · MRI.  How is this treated?  Treatment for plantar fasciitis depends on how severe your condition is. Treatment may include:  · Rest, ice, applying pressure (compression), and raising the affected foot (elevation). This may be called RICE therapy. Your health care provider may recommend RICE therapy along with over-the-counter pain medicines to manage your pain.  · Exercises to stretch your calves and your plantar fascia.  · A splint that holds your foot in a stretched, upward position while you sleep (night splint).  · Physical therapy to relieve symptoms and prevent problems in the future.  · Injections of steroid medicine (cortisone) to relieve pain and inflammation.  · Stimulating your plantar fascia with electrical impulses (extracorporeal shock wave therapy). This is usually the last treatment option before surgery.  · Surgery, if other treatments have not worked after 12 months.  Follow these instructions at home:    Managing pain, stiffness, and swelling  · If directed, put ice on the painful area:  ? Put ice in a plastic bag, or use a frozen bottle of water.  ? Place a towel between your skin and the bag or bottle.  ? Roll the bottom of your foot over the bag or bottle.  ? Do this for 20 minutes, 2-3 times a day.  · Wear athletic shoes that have air-sole or gel-sole cushions, or try wearing soft shoe inserts that are designed for plantar fasciitis.  · Raise (elevate) your foot above the level of your heart while you are sitting or lying down.  Activity  · Avoid activities that cause pain. Ask your health care provider what activities are safe for you.  · Do physical therapy exercises and stretches as told by your health care provider.  · Try activities and forms of exercise that are easier on your joints (low-impact). Examples include swimming, water aerobics,  and biking.  General instructions  · Take over-the-counter and prescription medicines only as told by your health care provider.  · Wear a night splint while sleeping, if told by your health care provider. Loosen the splint if your toes tingle, become numb, or turn cold and blue.  · Maintain a healthy weight, or work with your health care provider to lose weight as needed.  · Keep all follow-up visits as told by your health care provider. This is important.  Contact a health care provider if you:  · Have symptoms that do not go away after caring for yourself at home.  · Have pain that gets worse.  · Have pain that affects your ability to move or do your daily activities.  Summary  · Plantar fasciitis is a painful foot condition that affects the heel. It occurs when the band of tissue that connects the toes to the heel bone (plantar fascia) becomes irritated.  · The main symptom of this condition is heel pain that may be worse after exercising too much or standing still for a long time.  · Treatment varies, but it usually starts with rest, ice, compression, and elevation (RICE therapy) and over-the-counter medicines to manage pain.  This information is not intended to replace advice given to you by your health care provider. Make sure you discuss any questions you have with your health care provider.  Document Released: 09/12/2002 Document Revised: 11/30/2018 Document Reviewed: 10/15/2018  [employer] Patient Education © 2020 [employer]

## 2020-12-30 ENCOUNTER — OFFICE VISIT (OUTPATIENT)
Dept: MEDICAL GROUP | Facility: CLINIC | Age: 20
End: 2020-12-30
Payer: COMMERCIAL

## 2020-12-30 VITALS
DIASTOLIC BLOOD PRESSURE: 78 MMHG | OXYGEN SATURATION: 96 % | TEMPERATURE: 98.4 F | HEART RATE: 78 BPM | SYSTOLIC BLOOD PRESSURE: 118 MMHG

## 2020-12-30 DIAGNOSIS — M79.671 BILATERAL FOOT PAIN: ICD-10-CM

## 2020-12-30 DIAGNOSIS — M79.672 BILATERAL FOOT PAIN: ICD-10-CM

## 2020-12-30 PROCEDURE — 99214 OFFICE O/P EST MOD 30 MIN: CPT | Performed by: FAMILY MEDICINE

## 2020-12-30 NOTE — PROGRESS NOTES
Subjective:     Elizabeth Delgado is a 20 y.o. female who presents for Foot Problem (NP - (R) foot pain)    HPI  Pt presents for evaluation of right foot pain  Patient with right foot pain for the past month  Pain started after a long walk   No specific fall or injury that she can recall   Took another long walk a few days later, and pain increased greatly   Pain is more along the arch   Pain resolves when she is not up on her feet   Pain is worse when standing for too long   Pain up to 7-8/10 at times   No morning stiffness     Review of Systems   Constitutional: Negative for fever.   HENT: Negative for sore throat.    Respiratory: Negative for cough and shortness of breath.    Gastrointestinal: Negative for vomiting.   Skin: Negative for rash.   Neurological: Negative for tingling and sensory change.     PMH: No chronic medical problems  MEDS:   Current Outpatient Medications:   •  naproxen (NAPROSYN) 375 MG Tab, Take 1 Tab by mouth 3 times a day, with meals., Disp: 45 Tab, Rfl: 0  •  Phenazopyridine HCl (AZO TABS PO), Take  by mouth., Disp: , Rfl:   •  tamsulosin (FLOMAX) 0.4 MG capsule, Take 1 Cap by mouth ONE-HALF HOUR AFTER BREAKFAST. (Patient not taking: Reported on 4/24/2020), Disp: 14 Cap, Rfl: 0  ALLERGIES: No Known Allergies  SURGHX: History reviewed. No pertinent surgical history.  SOCHX:  reports that she has quit smoking. She smoked 0.00 packs per day. She has quit using smokeless tobacco.  Her smokeless tobacco use included chew. She reports previous drug use. Drug: Marijuana. She reports that she does not drink alcohol.  FH: Family history was reviewed, not contributing to acute foot pain      Objective:   /78 (BP Location: Left arm, Patient Position: Sitting, BP Cuff Size: Adult)   Pulse 78   Temp 36.9 °C (98.4 °F) (Temporal)   SpO2 96%     Physical Exam  Constitutional:       General: She is not in acute distress.     Appearance: She is well-developed. She is not diaphoretic.   HENT:       Head: Normocephalic and atraumatic.   Pulmonary:      Effort: Pulmonary effort is normal.   Musculoskeletal:      Comments: Bilateral feet:  Appearance - No bruising, erythema, or deformity appreciated  Palpation - +Mild along ATFL and deltoid ligament.  No TTP along medial malleolus.  No TTP of lateral malleolus.  No TTP along midfoot, arch, base of the 5th metatarsal, MTP joints, or toes.   ROM - FROM throughout  Strength - 5/5 throughout  Special testing - Neg squeeze test, neg drawer test  Neurovascular - 2+ dorsalis pedis and posterior tibial.  Sensation intact and equal bilaterally  Gait - +Pes planus with right foot with valgus deviation during ambulation which is correctable when she concentrates on walking straight   Skin:     General: Skin is warm and dry.      Findings: No erythema.   Neurological:      Mental Status: She is alert and oriented to person, place, and time.   Psychiatric:         Behavior: Behavior normal.         Thought Content: Thought content normal.         Judgment: Judgment normal.         Assessment/Plan:   Assessment    1. Bilateral foot pain  - REFERRAL TO PHYSICAL THERAPY    Patient with bilateral foot pain.  Does not have pain along the arch and is not classic plantar fasciitis.  She does have pes planus and walks with somewhat valgus foot on her right.  Suspect that her altered gait is one of the chief problems in her foot.  Advised that some insoles can be helpful and given information on where to obtain them.  Will refer to physical therapy to work on foot strengthening, improving gait, and should be able to get her feet feeling much better.  We will follow-up after few weeks of physical therapy if not making great improvements.

## 2021-01-03 ASSESSMENT — ENCOUNTER SYMPTOMS
VOMITING: 0
SORE THROAT: 0
SHORTNESS OF BREATH: 0
TINGLING: 0
COUGH: 0
FEVER: 0
SENSORY CHANGE: 0

## 2021-02-10 ENCOUNTER — APPOINTMENT (OUTPATIENT)
Dept: PHYSICAL THERAPY | Facility: REHABILITATION | Age: 21
End: 2021-02-10
Payer: COMMERCIAL

## 2021-02-16 ENCOUNTER — APPOINTMENT (OUTPATIENT)
Dept: PHYSICAL THERAPY | Facility: REHABILITATION | Age: 21
End: 2021-02-16
Payer: COMMERCIAL

## 2021-02-18 ENCOUNTER — APPOINTMENT (OUTPATIENT)
Dept: PHYSICAL THERAPY | Facility: REHABILITATION | Age: 21
End: 2021-02-18
Payer: COMMERCIAL

## 2021-03-02 ENCOUNTER — APPOINTMENT (OUTPATIENT)
Dept: PHYSICAL THERAPY | Facility: REHABILITATION | Age: 21
End: 2021-03-02
Payer: COMMERCIAL

## 2021-03-04 ENCOUNTER — APPOINTMENT (OUTPATIENT)
Dept: PHYSICAL THERAPY | Facility: REHABILITATION | Age: 21
End: 2021-03-04
Payer: COMMERCIAL

## 2021-05-27 ENCOUNTER — OFFICE VISIT (OUTPATIENT)
Dept: URGENT CARE | Facility: PHYSICIAN GROUP | Age: 21
End: 2021-05-27
Payer: COMMERCIAL

## 2021-05-27 VITALS
RESPIRATION RATE: 18 BRPM | WEIGHT: 265 LBS | HEIGHT: 70 IN | OXYGEN SATURATION: 98 % | BODY MASS INDEX: 37.94 KG/M2 | SYSTOLIC BLOOD PRESSURE: 128 MMHG | DIASTOLIC BLOOD PRESSURE: 88 MMHG | TEMPERATURE: 98.2 F | HEART RATE: 89 BPM

## 2021-05-27 DIAGNOSIS — S29.012A MUSCLE STRAIN OF LEFT UPPER BACK, INITIAL ENCOUNTER: ICD-10-CM

## 2021-05-27 PROCEDURE — 99213 OFFICE O/P EST LOW 20 MIN: CPT | Performed by: NURSE PRACTITIONER

## 2021-05-27 RX ORDER — IBUPROFEN 800 MG/1
800 TABLET ORAL EVERY 8 HOURS PRN
COMMUNITY
End: 2022-01-15

## 2021-05-27 RX ORDER — DICLOFENAC SODIUM 75 MG/1
75 TABLET, DELAYED RELEASE ORAL 2 TIMES DAILY
Qty: 30 TABLET | Refills: 0 | Status: SHIPPED
Start: 2021-05-27 | End: 2022-01-15

## 2021-05-27 RX ORDER — CYCLOBENZAPRINE HCL 10 MG
10 TABLET ORAL 3 TIMES DAILY PRN
Qty: 30 TABLET | Refills: 0 | Status: SHIPPED
Start: 2021-05-27 | End: 2022-01-15

## 2021-05-27 ASSESSMENT — ENCOUNTER SYMPTOMS: BACK PAIN: 1

## 2021-05-27 NOTE — LETTER
May 27, 2021         Patient: Elizabeth Delgado   YOB: 2000   Date of Visit: 5/27/2021           To Whom it May Concern:    Elizabeth Delgado was seen in my clinic on 5/27/2021. She may return to work on 6/1/21.    If you have any questions or concerns, please don't hesitate to call.        Sincerely,           Cathey J Hamman, A.P.N.  Electronically Signed

## 2021-05-27 NOTE — PROGRESS NOTES
Subjective:      Elizabeth Delgado is a 20 y.o. female who presents with Shortness of Breath (x1 day, Pt states SOB, left back pain )    No past medical history on file.  Social History     Socioeconomic History   • Marital status: Single     Spouse name: Not on file   • Number of children: Not on file   • Years of education: Not on file   • Highest education level: Not on file   Occupational History   • Not on file   Tobacco Use   • Smoking status: Former Smoker     Packs/day: 0.00     Types: Cigarettes   • Smokeless tobacco: Former User     Types: Chew   • Tobacco comment: occas   Vaping Use   • Vaping Use: Every day   • Substances: Nicotine   Substance and Sexual Activity   • Alcohol use: No   • Drug use: Not Currently     Types: Marijuana   • Sexual activity: Yes     Partners: Male   Other Topics Concern   • Behavioral problems Not Asked   • Interpersonal relationships Not Asked   • Sad or not enjoying activities Not Asked   • Suicidal thoughts Not Asked   • Poor school performance Not Asked   • Reading difficulties Not Asked   • Speech difficulties Not Asked   • Writing difficulties Not Asked   • Inadequate sleep Not Asked   • Excessive TV viewing Not Asked   • Excessive video game use Not Asked   • Inadequate exercise Not Asked   • Sports related Not Asked   • Poor diet Not Asked   • Family concerns for drug/alcohol abuse Not Asked   • Poor oral hygiene Not Asked   • Bike safety Not Asked   • Family concerns vehicle safety Not Asked   Social History Narrative   • Not on file     Social Determinants of Health     Financial Resource Strain:    • Difficulty of Paying Living Expenses:    Food Insecurity:    • Worried About Running Out of Food in the Last Year:    • Ran Out of Food in the Last Year:    Transportation Needs:    • Lack of Transportation (Medical):    • Lack of Transportation (Non-Medical):    Physical Activity:    • Days of Exercise per Week:    • Minutes of Exercise per Session:    Stress:    •  "Feeling of Stress :    Social Connections:    • Frequency of Communication with Friends and Family:    • Frequency of Social Gatherings with Friends and Family:    • Attends [employer] Services:    • Active Member of Clubs or Organizations:    • Attends Club or Organization Meetings:    • Marital Status:    Intimate Partner Violence:    • Fear of Current or Ex-Partner:    • Emotionally Abused:    • Physically Abused:    • Sexually Abused:      Family History   Problem Relation Age of Onset   • Hypertension Mother        Allergies: Patient has no allergy information on record.    Patient is a 20-year-old female who presents today with complaint of acute pain to the left upper mid back area.  She states symptoms started earlier this afternoon when she was in the shower.  Patient states she bent over to pick something up and as she straightened she had sudden sharp pain in the left side of her back that has been persistent since.  Patient states she has pain in the posterior left chest wall with deep breathing and pain with movement.        Other  This is a new problem. The current episode started today. The problem occurs constantly. The problem has been unchanged. Nothing aggravates the symptoms. She has tried nothing for the symptoms. The treatment provided no relief.       Review of Systems   Musculoskeletal: Positive for back pain.   All other systems reviewed and are negative.         Objective:     /88   Pulse 89   Temp 36.8 °C (98.2 °F) (Temporal)   Resp 18   Ht 1.778 m (5' 10\")   Wt 120 kg (265 lb)   SpO2 98%   BMI 38.02 kg/m²      Physical Exam  Vitals reviewed.   Constitutional:       Appearance: Normal appearance.   HENT:      Nose: Nose normal.   Eyes:      Extraocular Movements: Extraocular movements intact.   Cardiovascular:      Rate and Rhythm: Normal rate and regular rhythm.      Heart sounds: Normal heart sounds.   Pulmonary:      Effort: Pulmonary effort is normal.      Breath sounds: " Normal breath sounds.   Chest:      Chest wall: Tenderness present.   Musculoskeletal:         General: Normal range of motion.        Arms:       Cervical back: Normal range of motion and neck supple.      Comments: Point tenderness to the left side of the back.  Pain is reproduced with deep inspiration and range of motion of the left upper extremity and range of motion of the trunk.   Skin:     General: Skin is warm and dry.   Neurological:      Mental Status: She is alert.   Psychiatric:         Mood and Affect: Mood normal.         Behavior: Behavior normal.         Thought Content: Thought content normal.         Judgment: Judgment normal.                        Assessment/Plan:   Left upper back strain    Alternate ice and heat  Diclofenac  Flexeril as needed; clearly stated no driving or alcohol with this medication  Topical muscle rub of choice  Gentle massage as needed  Follow-up for persistent or worsening of symptoms  Note given for work       There are no diagnoses linked to this encounter.

## 2021-07-28 ENCOUNTER — OFFICE VISIT (OUTPATIENT)
Dept: URGENT CARE | Facility: PHYSICIAN GROUP | Age: 21
End: 2021-07-28
Payer: COMMERCIAL

## 2021-07-28 ENCOUNTER — HOSPITAL ENCOUNTER (OUTPATIENT)
Dept: RADIOLOGY | Facility: MEDICAL CENTER | Age: 21
End: 2021-07-28
Attending: PHYSICIAN ASSISTANT
Payer: COMMERCIAL

## 2021-07-28 VITALS
DIASTOLIC BLOOD PRESSURE: 66 MMHG | HEART RATE: 78 BPM | BODY MASS INDEX: 40.09 KG/M2 | RESPIRATION RATE: 18 BRPM | OXYGEN SATURATION: 99 % | TEMPERATURE: 97.4 F | WEIGHT: 280 LBS | SYSTOLIC BLOOD PRESSURE: 110 MMHG | HEIGHT: 70 IN

## 2021-07-28 DIAGNOSIS — S99.921A INJURY OF RIGHT FOOT, INITIAL ENCOUNTER: ICD-10-CM

## 2021-07-28 DIAGNOSIS — S99.919A ANKLE INJURY, INITIAL ENCOUNTER: ICD-10-CM

## 2021-07-28 DIAGNOSIS — S93.491A SPRAIN OF ANTERIOR TALOFIBULAR LIGAMENT OF RIGHT ANKLE, INITIAL ENCOUNTER: ICD-10-CM

## 2021-07-28 DIAGNOSIS — S93.601A FOOT SPRAIN, RIGHT, INITIAL ENCOUNTER: ICD-10-CM

## 2021-07-28 PROCEDURE — 73610 X-RAY EXAM OF ANKLE: CPT | Mod: RT

## 2021-07-28 PROCEDURE — 73630 X-RAY EXAM OF FOOT: CPT | Mod: RT

## 2021-07-28 PROCEDURE — 99214 OFFICE O/P EST MOD 30 MIN: CPT | Performed by: PHYSICIAN ASSISTANT

## 2021-07-28 NOTE — LETTER
July 28, 2021         Patient: Elizabeth Delgado   YOB: 2000   Date of Visit: 7/28/2021           To Whom it May Concern:    Elizabeth Delgado was seen in my clinic on 7/28/2021. Please allow to rest at least every hour for about 10-15 min. To sit, elevate, and ice due to medical reasons for at least one week.     If you have any questions or concerns, please don't hesitate to call.        Sincerely,           Miguel Guzman P.A.-C.  Electronically Signed

## 2021-07-29 NOTE — PROGRESS NOTES
"Subjective:   Elizabeth Delgado is a 20 y.o. female who presents for Ankle Injury (right ankle injury and 5th toe/ injury today)      HPI  Patient presents to clinic with complaints of right ankle and foot pain onset today.  She states she stepped off the curb and sustained inversion injury.  She developed pain to her lateral ankle, lateral foot, and fifth toe.  She has numbness to her fifth toe.  Difficulty moving her foot and toes due to the pain.  No tingling.  Denies any other injury.  No head injury.  She has no other complaints concerns      ROS    Medications:    • AZO TABS PO  • cyclobenzaprine Tabs  • diclofenac  Tbec  • ibuprofen Tabs  • naproxen Tabs    Allergies: Patient has no known allergies.    Problem List: Elizabeth Delgado does not have a problem list on file.    Surgical History:  No past surgical history on file.    Past Social Hx: Elizabeth Delgado  reports that she has been smoking cigarettes. She has been smoking about 0.00 packs per day. She has quit using smokeless tobacco.  Her smokeless tobacco use included chew. She reports current alcohol use. She reports previous drug use. Drug: Marijuana.     Past Family Hx:  Elizabeth Delgado family history includes Hypertension in her mother.     Problem list, medications, and allergies reviewed by myself today in Epic.     Objective:     /66 (BP Location: Right arm, Patient Position: Sitting, BP Cuff Size: Large adult)   Pulse 78   Temp 36.3 °C (97.4 °F) (Temporal)   Resp 18   Ht 1.778 m (5' 10\")   Wt (!) 127 kg (280 lb)   SpO2 99%   Breastfeeding No   BMI 40.18 kg/m²     Physical Exam  Vitals reviewed.   Constitutional:       General: She is not in acute distress.     Appearance: Normal appearance. She is not ill-appearing or toxic-appearing.   Eyes:      Conjunctiva/sclera: Conjunctivae normal.      Pupils: Pupils are equal, round, and reactive to light.   Cardiovascular:      Rate and Rhythm: Normal rate.   Pulmonary:      Effort: " Pulmonary effort is normal.   Musculoskeletal:        Feet:    Feet:      Comments: Right ankle: Tenderness to palpation over the ATFL. No bony tenderness. Lateral soft tissue swelling with no ecchymosis. TTP to the base of 5th toe. No other point bony tenderness of ankle, foot, or leg. Normal strength to both plantarflexion and dorsiflexion. Distal neuro/vascular intact. Skin intact.     Skin:     General: Skin is warm and dry.   Neurological:      General: No focal deficit present.      Mental Status: She is alert and oriented to person, place, and time.   Psychiatric:         Mood and Affect: Mood normal.         Behavior: Behavior normal.       RADIOLOGY RESULTS   DX-ANKLE 3+ VIEWS RIGHT    Result Date: 7/28/2021 7/28/2021 5:28 PM HISTORY/REASON FOR EXAM:  Pain/Deformity Following Trauma TECHNIQUE/EXAM DESCRIPTION AND NUMBER OF VIEWS:  3 views of the RIGHT ankle. COMPARISON: None FINDINGS: There is no evidence of fracture or dislocation.  The ankle mortise is well-maintained. The talar dome is preserved.  No substantial soft tissue swelling is noted. There is mild spurring of the calcaneus at the insertion of the Achilles tendon.     No evidence of fracture or dislocation.    DX-FOOT-COMPLETE 3+ RIGHT    Result Date: 7/28/2021 7/28/2021 5:28 PM HISTORY/REASON FOR EXAM:  Pain/Deformity Following Trauma; small toe pain and foot pain TECHNIQUE/EXAM DESCRIPTION AND NUMBER OF VIEWS: 3 nonweightbearing views of the RIGHT foot. COMPARISON:  None FINDINGS: There is no evidence of fracture or dislocation. There is mild hallux valgus deformity. There is no osseous erosion identified. There is mild spurring of the calcaneus at the insertion of the Achilles tendon. There is soft tissue swelling about the fifth  MTP joint.     1.  No evidence of acute fracture or dislocation. 2.  Soft tissue swelling about the fifth MTP joint.           Diagnosis and associated orders:     1. Sprain of anterior talofibular ligament of  right ankle, initial encounter  DX-ANKLE 3+ VIEWS RIGHT   2. Foot sprain, right, initial encounter  DX-FOOT-COMPLETE 3+ RIGHT      Comments/MDM:     • X-ray results per radiologist interpretation above. I personally reviewed images and radiologist report.   Discussed with patient signs and symptoms are most likely consistent with an ankle sprain and foot sprain.   • Treatment of compression with either Ace wrap or boot initially. Patient requesting walking boot. Rest, elevation at the level of your heart or higher, ice application.  Ibuprofen 400 to 800 mg PO every 8 hours as needed for moderate to severe pain.  Avoid running or any strenuous physical activity.  Gradual increase in range of motion exercises and weightbearing as tolerated.        I personally reviewed prior external notes and test results pertinent to today's visit. Supportive care, natural history, differential diagnoses, and indications for immediate follow-up discussed. Patient expresses understanding and agrees to plan. Patient denies any other questions or concerns.     Advised the patient to follow-up with the primary care physician for recheck, reevaluation, and consideration of further management.    Please note that this dictation was created using voice recognition software. I have made a reasonable attempt to correct obvious errors, but I expect that there are errors of grammar and possibly content that I did not discover before finalizing the note.    This note was electronically signed by Miguel Guzman PA-C

## 2022-01-15 ENCOUNTER — OFFICE VISIT (OUTPATIENT)
Dept: URGENT CARE | Facility: PHYSICIAN GROUP | Age: 22
End: 2022-01-15
Payer: COMMERCIAL

## 2022-01-15 VITALS
WEIGHT: 260 LBS | OXYGEN SATURATION: 96 % | HEART RATE: 67 BPM | TEMPERATURE: 97.5 F | SYSTOLIC BLOOD PRESSURE: 114 MMHG | RESPIRATION RATE: 16 BRPM | DIASTOLIC BLOOD PRESSURE: 82 MMHG | BODY MASS INDEX: 37.22 KG/M2 | HEIGHT: 70 IN

## 2022-01-15 DIAGNOSIS — L03.213 PERIORBITAL CELLULITIS OF RIGHT EYE: ICD-10-CM

## 2022-01-15 DIAGNOSIS — H10.9 CONJUNCTIVITIS OF RIGHT EYE, UNSPECIFIED CONJUNCTIVITIS TYPE: ICD-10-CM

## 2022-01-15 PROCEDURE — 99214 OFFICE O/P EST MOD 30 MIN: CPT | Performed by: NURSE PRACTITIONER

## 2022-01-15 RX ORDER — AMOXICILLIN AND CLAVULANATE POTASSIUM 875; 125 MG/1; MG/1
1 TABLET, FILM COATED ORAL 2 TIMES DAILY
Qty: 14 TABLET | Refills: 0 | Status: SHIPPED | OUTPATIENT
Start: 2022-01-15 | End: 2022-01-22

## 2022-01-15 RX ORDER — POLYMYXIN B SULFATE AND TRIMETHOPRIM 1; 10000 MG/ML; [USP'U]/ML
1 SOLUTION OPHTHALMIC EVERY 4 HOURS
Qty: 10 ML | Refills: 0 | Status: SHIPPED | OUTPATIENT
Start: 2022-01-15 | End: 2022-01-22

## 2022-01-15 ASSESSMENT — ENCOUNTER SYMPTOMS
EYE DISCHARGE: 1
WEAKNESS: 0
EYE ITCHING: 1
DOUBLE VISION: 1
PHOTOPHOBIA: 1
CHILLS: 0
BLURRED VISION: 1
EYE PAIN: 1
EYE REDNESS: 1
SENSORY CHANGE: 0
FEVER: 0
CONSTITUTIONAL NEGATIVE: 1
HEADACHES: 1

## 2022-01-15 ASSESSMENT — VISUAL ACUITY: OU: 1

## 2022-01-15 NOTE — PROGRESS NOTES
Subjective:     Elizabeth Delgado is a 21 y.o. female who presents for Eye Problem (right redness, swelling, itchy x 1 day )       Eye Problem   The right eye is affected. This is a new problem. The problem has been gradually worsening. There was no injury mechanism. Associated symptoms include blurred vision, an eye discharge, double vision, eye redness, itching and photophobia. Pertinent negatives include no fever or weakness.     Patient was screened prior to rooming and denied COVID-19 diagnosis or contact with a person who has been diagnosed or is suspected to have COVID-19. During this visit, appropriate PPE was worn, hand hygiene was performed, and the patient and any visitors were masked.     PMH:  has no past medical history on file.    MEDS:   Current Outpatient Medications:   •  polymixin-trimethoprim (POLYTRIM) 08046-5.1 UNIT/ML-% Solution, Administer 1 Drop into both eyes every 4 hours for 7 days., Disp: 10 mL, Rfl: 0  •  amoxicillin-clavulanate (AUGMENTIN) 875-125 MG Tab, Take 1 Tablet by mouth 2 times a day for 7 days., Disp: 14 Tablet, Rfl: 0  •  ibuprofen (MOTRIN) 800 MG Tab, Take 800 mg by mouth every 8 hours as needed. (Patient not taking: Reported on 7/28/2021), Disp: , Rfl:   •  cyclobenzaprine (FLEXERIL) 10 mg Tab, Take 1 tablet by mouth 3 times a day as needed. (Patient not taking: Reported on 7/28/2021), Disp: 30 tablet, Rfl: 0  •  diclofenac DR (VOLTAREN) 75 MG Tablet Delayed Response, Take 1 tablet by mouth 2 times a day. (Patient not taking: Reported on 7/28/2021), Disp: 30 tablet, Rfl: 0  •  naproxen (NAPROSYN) 375 MG Tab, Take 1 Tab by mouth 3 times a day, with meals. (Patient not taking: Reported on 5/27/2021), Disp: 45 Tab, Rfl: 0  •  Phenazopyridine HCl (AZO TABS PO), Take  by mouth. (Patient not taking: Reported on 5/27/2021), Disp: , Rfl:     ALLERGIES: No Known Allergies    SURGHX: History reviewed. No pertinent surgical history.    SOCHX:  reports that she has been smoking  "cigarettes. She has been smoking about 0.00 packs per day. She has quit using smokeless tobacco.  Her smokeless tobacco use included chew. She reports current alcohol use. She reports previous drug use. Drug: Marijuana.     FH: Reviewed with patient, not pertinent to this visit.    Review of Systems   Constitutional: Negative.  Negative for chills, fever and malaise/fatigue.   Eyes: Positive for blurred vision, double vision, photophobia, pain, discharge, redness and itching.   Neurological: Positive for headaches (Right frontal, radiating from eye). Negative for sensory change and weakness.   All other systems reviewed and are negative.    Additional details per HPI.      Objective:     /82 (BP Location: Left arm, Patient Position: Sitting, BP Cuff Size: Adult)   Pulse 67   Temp 36.4 °C (97.5 °F) (Temporal)   Resp 16   Ht 1.778 m (5' 10\")   Wt 118 kg (260 lb)   SpO2 96%   BMI 37.31 kg/m²     Physical Exam  Vitals reviewed.   Constitutional:       General: She is not in acute distress.     Appearance: She is well-developed. She is not ill-appearing or toxic-appearing.   HENT:      Right Ear: External ear normal.      Left Ear: External ear normal.      Nose:      Right Sinus: Maxillary sinus tenderness and frontal sinus tenderness present.   Eyes:      General: Vision grossly intact.         Right eye: No discharge.         Left eye: No discharge.      Extraocular Movements: Extraocular movements intact.      Right eye: Normal extraocular motion.      Left eye: Normal extraocular motion.      Conjunctiva/sclera:      Right eye: Right conjunctiva is injected. No chemosis or hemorrhage.     Pupils: Pupils are equal, round, and reactive to light.      Comments: Mild erythema, tenderness of right upper/lower eyelids and surrounding right eye   Cardiovascular:      Rate and Rhythm: Normal rate.   Pulmonary:      Effort: Pulmonary effort is normal. No respiratory distress.   Musculoskeletal:         General: " No deformity. Normal range of motion.      Cervical back: Normal range of motion.   Skin:     General: Skin is warm and dry.      Coloration: Skin is not pale.   Neurological:      Mental Status: She is alert and oriented to person, place, and time.      Sensory: No sensory deficit.      Motor: No weakness.      Coordination: Coordination normal.   Psychiatric:         Behavior: Behavior normal. Behavior is cooperative.       Assessment/Plan:     1. Conjunctivitis of right eye, unspecified conjunctivitis type  - polymixin-trimethoprim (POLYTRIM) 91383-9.1 UNIT/ML-% Solution; Administer 1 Drop into both eyes every 4 hours for 7 days.  Dispense: 10 mL; Refill: 0    2. Periorbital cellulitis of right eye  - amoxicillin-clavulanate (AUGMENTIN) 875-125 MG Tab; Take 1 Tablet by mouth 2 times a day for 7 days.  Dispense: 14 Tablet; Refill: 0    Rx as above sent electronically.     Differential diagnosis, natural history, supportive care, over-the-counter symptom management per 's instructions, ibuprofen, close monitoring, and indications for immediate follow-up discussed.     Vital signs stable, afebrile, no acute distress at this time. Warning signs reviewed. Strict return/ER precautions advised.     All questions answered. Patient agrees with the plan of care.    Discharge summary provided.    Work note provided.     Billing note: 30 minutes was allotted and spent for patient care and coordination of care (not reported separately) including preparing for the visit, obtaining/reviewing history, performing an exam/evaluation, ordering Rx, developing a plan of care, counseling/educating the patient, developing/printing/going over the discharge summary with the patient, producing a work note, updating the medical record, reconciling outside information, and documentation. Care specific to this encounter was summarized here. Please refer to the chart for additional details on the care provided.

## 2022-01-15 NOTE — LETTER
January 15, 2022         Patient: Elizabeth Delgado   YOB: 2000   Date of Visit: 1/15/2022           To Whom it May Concern:    Elizabeth Delgado was seen in my clinic on 1/15/2022 due to illness. Due to medical necessity, please excuse patient from work for up to the next 3 days as needed.     If you have any questions or concerns, please don't hesitate to call.        Sincerely,         VIMAL Mclain.  Electronically Signed

## 2022-01-15 NOTE — PATIENT INSTRUCTIONS
"Conjunctivitis  Conjunctivitis is commonly called \"pink eye.\" Conjunctivitis can be caused by bacterial or viral infection, allergies, or injuries. There is usually redness of the lining of the eye, itching, discomfort, and sometimes discharge. There may be deposits of matter along the eyelids. A viral infection usually causes a watery discharge, while a bacterial infection causes a yellowish, thick discharge. Pink eye is very contagious and spreads by direct contact.  You may be given antibiotic eyedrops as part of your treatment. Before using your eye medicine, remove all drainage from the eye by washing gently with warm water and cotton balls. Continue to use the medication until you have awakened 2 mornings in a row without discharge from the eye. Do not rub your eye. This increases the irritation and helps spread infection. Use separate towels from other household members. Wash your hands with soap and water before and after touching your eyes. Use cold compresses to reduce pain and sunglasses to relieve irritation from light. Do not wear contact lenses or wear eye makeup until the infection is gone.  SEEK MEDICAL CARE IF:   · Your symptoms are not better after 3 days of treatment.  · You have increased pain or trouble seeing.  · The outer eyelids become very red or swollen.  Document Released: 01/25/2006 Document Revised: 03/11/2013 Document Reviewed: 12/18/2006  [employer]® Patient Information ©2014 [employer].          Preseptal Cellulitis, Adult    Preseptal cellulitis is an infection of the eyelid and the tissues around the eye (periorbital area). The infection causes painful swelling and redness. This condition may also be called periorbital cellulitis.  In most cases, the condition can be treated with antibiotic medicine at home. It is important to treat preseptal cellulitis right away so that it does not get worse. If it gets worse, it can spread to the eye socket and eye muscles (orbital cellulitis). " Orbital cellulitis is a medical emergency.  What are the causes?  Preseptal cellulitis is most commonly caused by bacteria. In rare cases, it can be caused by a virus or fungus. The germs that cause preseptal cellulitis may come from:  · A sinus infection that spreads near the eyes.  · An injury near the eye, such as a scratch, animal bite, or insect bite.  · A skin rash that becomes infected, such as eczema or poison ivy.  · An infected pimple on the eyelid (stye).  · Infection after eyelid surgery or injury.  What increases the risk?  You are more likely to develop this condition if:  · You have a weakened disease-fighting system (immune system).  · You have a medical condition that raises your risk for sinus infections, such as nasal polyps.  What are the signs or symptoms?  Symptoms of this condition usually develop suddenly. Symptoms may include:  · Eyelids that are red and swollen and feel unusually hot.  · Fever.  · Difficulty opening the eye.  · Headache.  · Facial pain.  How is this diagnosed?  This condition may be diagnosed based on your symptoms, your medical history, and an eye exam. You may have tests, such as:  · Blood tests.  · CT scan.  · MRI.  How is this treated?  This condition is treated with antibiotic medicines. These may be given by mouth (orally), through an IV, or as a shot. In rare cases, you may need surgery to drain an infected area.  Follow these instructions at home:  Medicines  · If you were prescribed an antibiotic to take at home, take it as told by your health care provider. Do not stop taking the antibiotic even if you start to feel better.  · Take over-the-counter and prescription medicines only as told by your health care provider.  Eye Care  · Do not use eye drops without first getting approval from your health care provider.  · Do not touch or rub your eye. If you wear contact lenses, do not wear them until your health care provider approves.  · Keep the eye area clean and  dry.  · Wash the eye area with a clean washcloth, warm water, and baby shampoo or mild soap.  · To help relieve discomfort, place a clean washcloth that is wet with warm water over your eye. Leave the washcloth on for a few minutes, then remove it.  General instructions  · Wash your hands with soap and water often. If soap and water are not available, use hand .  · Do not use any products that contain nicotine or tobacco, such as cigarettes and e-cigarettes. If you need help quitting, ask your health care provider.  · Drink enough fluid to keep your urine pale yellow.  · Ask your health care provider if it is safe for you to drive.  · Stay up to date on your vaccinations.  · Keep all follow-up visits as told by your health care provider. This includes any visits with an eye specialist (ophthalmologist) or dentist. This is important.  Get help right away if:  · You have new symptoms.  · Your symptoms get worse or do not get better with treatment.  · You have a fever.  · Your vision becomes blurry or gets worse in any way.  · Your eye looks like it is sticking out or bulging out (proptosis).  · You have trouble moving your eyes.  · You have a severe headache.  · You have neck stiffness or severe neck pain.  Summary  · Preseptal cellulitis is an infection of the eyelid and the tissues around the eye.  · Symptoms of preseptal cellulitis usually develop suddenly and include red and swollen eyelids, fever, difficulty opening the eye, headache, and facial pain.  · This condition is treated with antibiotic medicines. Do not stop taking the antibiotic even if you start to feel better.  This information is not intended to replace advice given to you by your health care provider. Make sure you discuss any questions you have with your health care provider.  Document Released: 01/20/2012 Document Revised: 11/30/2018 Document Reviewed: 10/10/2018  Elsevier Patient Education © 2020 [employer]

## 2023-03-10 ENCOUNTER — HOSPITAL ENCOUNTER (OUTPATIENT)
Facility: MEDICAL CENTER | Age: 23
End: 2023-03-10
Attending: PHYSICIAN ASSISTANT
Payer: COMMERCIAL

## 2023-03-10 ENCOUNTER — OFFICE VISIT (OUTPATIENT)
Dept: URGENT CARE | Facility: PHYSICIAN GROUP | Age: 23
End: 2023-03-10
Payer: COMMERCIAL

## 2023-03-10 VITALS
HEIGHT: 70 IN | RESPIRATION RATE: 18 BRPM | SYSTOLIC BLOOD PRESSURE: 122 MMHG | OXYGEN SATURATION: 97 % | WEIGHT: 260 LBS | DIASTOLIC BLOOD PRESSURE: 64 MMHG | HEART RATE: 80 BPM | TEMPERATURE: 98.1 F | BODY MASS INDEX: 37.22 KG/M2

## 2023-03-10 DIAGNOSIS — Z20.2 POSSIBLE EXPOSURE TO STD: ICD-10-CM

## 2023-03-10 DIAGNOSIS — R30.0 DYSURIA: ICD-10-CM

## 2023-03-10 DIAGNOSIS — N89.8 VAGINAL IRRITATION: ICD-10-CM

## 2023-03-10 LAB
APPEARANCE UR: ABNORMAL
BILIRUB UR STRIP-MCNC: ABNORMAL MG/DL
COLOR UR AUTO: ABNORMAL
GLUCOSE UR STRIP.AUTO-MCNC: ABNORMAL MG/DL
KETONES UR STRIP.AUTO-MCNC: ABNORMAL MG/DL
LEUKOCYTE ESTERASE UR QL STRIP.AUTO: ABNORMAL
NITRITE UR QL STRIP.AUTO: ABNORMAL
PH UR STRIP.AUTO: 5.5 [PH] (ref 5–8)
POCT INT CON NEG: NEGATIVE
POCT INT CON POS: POSITIVE
POCT URINE PREGNANCY TEST: NEGATIVE
PROT UR QL STRIP: >=300 MG/DL
RBC UR QL AUTO: ABNORMAL
SP GR UR STRIP.AUTO: >=1.03
UROBILINOGEN UR STRIP-MCNC: 0.2 MG/DL

## 2023-03-10 PROCEDURE — 87491 CHLMYD TRACH DNA AMP PROBE: CPT

## 2023-03-10 PROCEDURE — 87660 TRICHOMONAS VAGIN DIR PROBE: CPT

## 2023-03-10 PROCEDURE — 99214 OFFICE O/P EST MOD 30 MIN: CPT | Performed by: PHYSICIAN ASSISTANT

## 2023-03-10 PROCEDURE — 87591 N.GONORRHOEAE DNA AMP PROB: CPT

## 2023-03-10 PROCEDURE — 81025 URINE PREGNANCY TEST: CPT | Performed by: PHYSICIAN ASSISTANT

## 2023-03-10 PROCEDURE — 87510 GARDNER VAG DNA DIR PROBE: CPT

## 2023-03-10 PROCEDURE — 87480 CANDIDA DNA DIR PROBE: CPT

## 2023-03-10 PROCEDURE — 81002 URINALYSIS NONAUTO W/O SCOPE: CPT | Performed by: PHYSICIAN ASSISTANT

## 2023-03-10 PROCEDURE — 87086 URINE CULTURE/COLONY COUNT: CPT

## 2023-03-10 ASSESSMENT — ENCOUNTER SYMPTOMS
FEVER: 0
VOMITING: 0
FLANK PAIN: 0
CHILLS: 0
ABDOMINAL PAIN: 0
NAUSEA: 0
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
## 2023-03-11 DIAGNOSIS — N89.8 VAGINAL IRRITATION: ICD-10-CM

## 2023-03-11 DIAGNOSIS — R30.0 DYSURIA: ICD-10-CM

## 2023-03-11 LAB
CANDIDA DNA VAG QL PROBE+SIG AMP: NEGATIVE
G VAGINALIS DNA VAG QL PROBE+SIG AMP: POSITIVE
T VAGINALIS DNA VAG QL PROBE+SIG AMP: NEGATIVE

## 2023-03-12 DIAGNOSIS — N76.0 BV (BACTERIAL VAGINOSIS): ICD-10-CM

## 2023-03-12 DIAGNOSIS — B96.89 BV (BACTERIAL VAGINOSIS): ICD-10-CM

## 2023-03-12 LAB
C TRACH DNA GENITAL QL NAA+PROBE: NEGATIVE
N GONORRHOEA DNA GENITAL QL NAA+PROBE: NEGATIVE
SPECIMEN SOURCE: NORMAL

## 2023-03-12 RX ORDER — METRONIDAZOLE 7.5 MG/G
1 GEL VAGINAL
Qty: 5 EACH | Refills: 0 | Status: SHIPPED | OUTPATIENT
Start: 2023-03-12 | End: 2023-03-17

## 2023-03-13 ENCOUNTER — TELEPHONE (OUTPATIENT)
Dept: URGENT CARE | Facility: PHYSICIAN GROUP | Age: 23
End: 2023-03-13
Payer: COMMERCIAL

## 2023-03-13 LAB
BACTERIA UR CULT: NORMAL
SIGNIFICANT IND 70042: NORMAL
SITE SITE: NORMAL
SOURCE SOURCE: NORMAL

## 2023-03-13 NOTE — TELEPHONE ENCOUNTER
Called patient and notified her that her medication was sent to her pharmacy, she ask about medication for her  I notified her that he would need to be seen and tested before any medication could be sent for him.

## 2023-03-14 ENCOUNTER — HOSPITAL ENCOUNTER (OUTPATIENT)
Dept: LAB | Facility: MEDICAL CENTER | Age: 23
End: 2023-03-14
Attending: PHYSICIAN ASSISTANT
Payer: COMMERCIAL

## 2023-03-14 DIAGNOSIS — Z20.2 POSSIBLE EXPOSURE TO STD: ICD-10-CM

## 2023-03-14 PROCEDURE — 87389 HIV-1 AG W/HIV-1&-2 AB AG IA: CPT

## 2023-03-14 PROCEDURE — 86704 HEP B CORE ANTIBODY TOTAL: CPT

## 2023-03-14 PROCEDURE — 86803 HEPATITIS C AB TEST: CPT

## 2023-03-14 PROCEDURE — 86706 HEP B SURFACE ANTIBODY: CPT

## 2023-03-14 PROCEDURE — 36415 COLL VENOUS BLD VENIPUNCTURE: CPT

## 2023-03-14 PROCEDURE — 87340 HEPATITIS B SURFACE AG IA: CPT

## 2023-03-14 PROCEDURE — 86780 TREPONEMA PALLIDUM: CPT

## 2023-03-15 LAB
HBV CORE AB SERPL QL IA: NONREACTIVE
HBV SURFACE AB SERPL IA-ACNC: <3.5 MIU/ML (ref 0–10)
HBV SURFACE AG SER QL: NORMAL
HCV AB SER QL: NORMAL
HIV 1+2 AB+HIV1 P24 AG SERPL QL IA: NORMAL
T PALLIDUM AB SER QL IA: NORMAL

## 2023-10-19 ENCOUNTER — TELEPHONE (OUTPATIENT)
Dept: URGENT CARE | Facility: PHYSICIAN GROUP | Age: 23
End: 2023-10-19
Payer: COMMERCIAL

## 2023-12-18 ENCOUNTER — HOSPITAL ENCOUNTER (OUTPATIENT)
Dept: LAB | Facility: MEDICAL CENTER | Age: 23
End: 2023-12-18
Attending: NURSE PRACTITIONER
Payer: COMMERCIAL

## 2023-12-18 ENCOUNTER — OFFICE VISIT (OUTPATIENT)
Dept: MEDICAL GROUP | Facility: MEDICAL CENTER | Age: 23
End: 2023-12-18
Payer: COMMERCIAL

## 2023-12-18 VITALS
HEIGHT: 70 IN | HEART RATE: 98 BPM | WEIGHT: 223.33 LBS | SYSTOLIC BLOOD PRESSURE: 106 MMHG | DIASTOLIC BLOOD PRESSURE: 62 MMHG | OXYGEN SATURATION: 98 % | TEMPERATURE: 98.1 F | BODY MASS INDEX: 31.97 KG/M2

## 2023-12-18 DIAGNOSIS — Z3A.14 14 WEEKS GESTATION OF PREGNANCY: ICD-10-CM

## 2023-12-18 DIAGNOSIS — Z76.89 ENCOUNTER TO ESTABLISH CARE: ICD-10-CM

## 2023-12-18 DIAGNOSIS — N93.9 VAGINA BLEEDING: ICD-10-CM

## 2023-12-18 DIAGNOSIS — R51.9 NONINTRACTABLE HEADACHE, UNSPECIFIED CHRONICITY PATTERN, UNSPECIFIED HEADACHE TYPE: ICD-10-CM

## 2023-12-18 LAB
ALBUMIN SERPL BCP-MCNC: 3.4 G/DL (ref 3.2–4.9)
ALBUMIN/GLOB SERPL: 1.3 G/DL
ALP SERPL-CCNC: 63 U/L (ref 30–99)
ALT SERPL-CCNC: 27 U/L (ref 2–50)
ANION GAP SERPL CALC-SCNC: 9 MMOL/L (ref 7–16)
AST SERPL-CCNC: 26 U/L (ref 12–45)
B-HCG SERPL-ACNC: ABNORMAL MIU/ML (ref 0–5)
BILIRUB SERPL-MCNC: 0.9 MG/DL (ref 0.1–1.5)
BUN SERPL-MCNC: 7 MG/DL (ref 8–22)
CALCIUM ALBUM COR SERPL-MCNC: 10 MG/DL (ref 8.5–10.5)
CALCIUM SERPL-MCNC: 9.5 MG/DL (ref 8.5–10.5)
CHLORIDE SERPL-SCNC: 107 MMOL/L (ref 96–112)
CO2 SERPL-SCNC: 23 MMOL/L (ref 20–33)
CREAT SERPL-MCNC: 0.46 MG/DL (ref 0.5–1.4)
ERYTHROCYTE [DISTWIDTH] IN BLOOD BY AUTOMATED COUNT: 43.2 FL (ref 35.9–50)
GFR SERPLBLD CREATININE-BSD FMLA CKD-EPI: 138 ML/MIN/1.73 M 2
GLOBULIN SER CALC-MCNC: 2.7 G/DL (ref 1.9–3.5)
GLUCOSE SERPL-MCNC: 92 MG/DL (ref 65–99)
HCT VFR BLD AUTO: 43 % (ref 37–47)
HGB BLD-MCNC: 14.8 G/DL (ref 12–16)
MCH RBC QN AUTO: 31.6 PG (ref 27–33)
MCHC RBC AUTO-ENTMCNC: 34.4 G/DL (ref 32.2–35.5)
MCV RBC AUTO: 91.7 FL (ref 81.4–97.8)
PLATELET # BLD AUTO: 244 K/UL (ref 164–446)
PMV BLD AUTO: 11.1 FL (ref 9–12.9)
POTASSIUM SERPL-SCNC: 3.9 MMOL/L (ref 3.6–5.5)
PROT SERPL-MCNC: 6.1 G/DL (ref 6–8.2)
RBC # BLD AUTO: 4.69 M/UL (ref 4.2–5.4)
RUBV AB SER QL: 466 IU/ML
SODIUM SERPL-SCNC: 139 MMOL/L (ref 135–145)
TSH SERPL DL<=0.005 MIU/L-ACNC: 4.27 UIU/ML (ref 0.38–5.33)
WBC # BLD AUTO: 9.4 K/UL (ref 4.8–10.8)

## 2023-12-18 PROCEDURE — 99214 OFFICE O/P EST MOD 30 MIN: CPT | Performed by: NURSE PRACTITIONER

## 2023-12-18 PROCEDURE — 84443 ASSAY THYROID STIM HORMONE: CPT

## 2023-12-18 PROCEDURE — 86762 RUBELLA ANTIBODY: CPT

## 2023-12-18 PROCEDURE — 3074F SYST BP LT 130 MM HG: CPT | Performed by: NURSE PRACTITIONER

## 2023-12-18 PROCEDURE — 36415 COLL VENOUS BLD VENIPUNCTURE: CPT

## 2023-12-18 PROCEDURE — 3078F DIAST BP <80 MM HG: CPT | Performed by: NURSE PRACTITIONER

## 2023-12-18 PROCEDURE — 84702 CHORIONIC GONADOTROPIN TEST: CPT

## 2023-12-18 PROCEDURE — 85027 COMPLETE CBC AUTOMATED: CPT

## 2023-12-18 PROCEDURE — 80053 COMPREHEN METABOLIC PANEL: CPT

## 2023-12-18 RX ORDER — CEFDINIR 300 MG/1
CAPSULE ORAL
COMMUNITY
Start: 2023-10-25 | End: 2023-12-18

## 2023-12-18 ASSESSMENT — PATIENT HEALTH QUESTIONNAIRE - PHQ9: CLINICAL INTERPRETATION OF PHQ2 SCORE: 0

## 2023-12-18 NOTE — PROGRESS NOTES
"Chief Complaint   Patient presents with    Establish Care    Gynecologic Exam      Elizabeth Delgado is a 23 y.o. female here to establish care and to discuss the evaluation and management of:    Mother accompanies patient today.     Denies previous PCP to obtain records from.    Requesting follow-up blood work and referral to OB/GYN  She tells me that she is 14 weeks pregnant.  States she has been taking her prenatals ever since finding out she was pregnant.  Denies any previous pregnancies.    LMP : 9/5/2023-  Does not recall hx of Pap-but mother states she has had one.   Denies vaginal bleeding.-Was evaluated at [facility] back in October for vaginal bleeding-denies any further bleeding.  Denies nausea at this time.  Last STI panel 3/2023-Negative  Having some headaches-can last all day-Tylenol can be somewhat helpful.   Has been staying hydrated.    -Mother wants to know if she can take a over-the-counter potassium supplement in addition to her prenatals \"because she does not eat enough potassium containing foods.\"  Denies history of high having chronic hypokalemia.  Recommend obtaining blood work prior.    Chart reviewed    ROS: SEE ABOVE        Current Outpatient Medications:     Prenatal Vit-Fe Fumarate-FA (PRENATAL PO), Take  by mouth., Disp: , Rfl:     No Known Allergies    History reviewed. No pertinent past medical history.  History reviewed. No pertinent surgical history.  Family History   Problem Relation Age of Onset    Hypertension Mother     Cancer Maternal Grandfather     Cancer Other         non specific     Social History     Socioeconomic History    Marital status: Single     Spouse name: Not on file    Number of children: Not on file    Years of education: Not on file    Highest education level: Not on file   Occupational History    Not on file   Tobacco Use    Smoking status: Former     Types: Cigarettes    Smokeless tobacco: Never    Tobacco comments:     Smoked in high school briefly. " "   Vaping Use    Vaping Use: Some days    Substances: Nicotine   Substance and Sexual Activity    Alcohol use: Not Currently    Drug use: Not Currently     Types: Marijuana    Sexual activity: Yes     Partners: Male   Other Topics Concern    Behavioral problems Not Asked    Interpersonal relationships Not Asked    Sad or not enjoying activities Not Asked    Suicidal thoughts Not Asked    Poor school performance Not Asked    Reading difficulties Not Asked    Speech difficulties Not Asked    Writing difficulties Not Asked    Inadequate sleep Not Asked    Excessive TV viewing Not Asked    Excessive video game use Not Asked    Inadequate exercise Not Asked    Sports related Not Asked    Poor diet Not Asked    Family concerns for drug/alcohol abuse Not Asked    Poor oral hygiene Not Asked    Bike safety Not Asked    Family concerns vehicle safety Not Asked   Social History Narrative    Not on file     Social Determinants of Health     Financial Resource Strain: Not on file   Food Insecurity: Not on file   Transportation Needs: Not on file   Physical Activity: Not on file   Stress: Not on file   Social Connections: Not on file   Intimate Partner Violence: Not on file   Housing Stability: Not on file       Objective:     Vitals: /62 (BP Location: Right arm, Patient Position: Sitting, BP Cuff Size: Adult)   Pulse 98   Temp 36.7 °C (98.1 °F) (Temporal)   Ht 1.778 m (5' 10\")   Wt 101 kg (223 lb 5.2 oz)   SpO2 98%   BMI 32.04 kg/m²      General: Alert, pleasant, NAD  HEENT:  Normocephalic.  Neck supple.  No thyromegaly or masses palpated. No cervical or supraclavicular lymphadenopathy.  Heart:  Regular rate and rhythm.  S1 and S2 normal.  No murmurs appreciated.    Respiratory:  Normal respiratory effort.  Clear to auscultation bilaterally.    Skin:  Warm, dry, no rashes  Musculoskeletal:  Gait is normal.  Moves all extremities well.  Extremities:   No leg edema.  Neurological: No tremors  Psych:  Affect/mood is " normal, judgement is good, memory is intact, grooming is appropriate.    Assessment and Plan.     23 y.o. female to establish care and discuss the followin. 14 weeks gestation of pregnancy  New problem. Unconfirmed by ultrasound.  Denies any further vaginal bleeding, nausea or significant pelvic cramping.  We discussed ordering HCG levels as well as CBC, CMP, TSH.  - Referral to OB/Gyn  - HCG QUANTITATIVE; Future  - TSH; Future  - RUBELLA ABS IGG; Future  - Comp Metabolic Panel; Future  - CBC WITHOUT DIFFERENTIAL; Future    2. Vagina bleeding  Isolated event per patient 10/27/2023-presented to [facility] for evaluation.  -Requesting records.  No further bleeding.    3. Nonintractable headache, unspecified chronicity pattern, unspecified headache type  Stable.  We will check CBC, CMP and thyroid.  Recommend adequate hydration avoid excessive caffeine use although avoid abruptly stopping caffeine to avoid withdrawal headache.    4. Encounter to establish care      Return if symptoms worsen or fail to improve.        Pura CELIS.

## 2024-01-25 ENCOUNTER — HOSPITAL ENCOUNTER (OUTPATIENT)
Facility: MEDICAL CENTER | Age: 24
End: 2024-01-25
Attending: OBSTETRICS & GYNECOLOGY
Payer: COMMERCIAL

## 2024-01-25 ENCOUNTER — INITIAL PRENATAL (OUTPATIENT)
Dept: OBGYN | Facility: CLINIC | Age: 24
End: 2024-01-25
Payer: COMMERCIAL

## 2024-01-25 VITALS — DIASTOLIC BLOOD PRESSURE: 63 MMHG | BODY MASS INDEX: 32.86 KG/M2 | SYSTOLIC BLOOD PRESSURE: 122 MMHG | WEIGHT: 229 LBS

## 2024-01-25 DIAGNOSIS — Z34.02 PRIMIGRAVIDA IN SECOND TRIMESTER: Primary | ICD-10-CM

## 2024-01-25 DIAGNOSIS — Z34.02 PRIMIGRAVIDA IN SECOND TRIMESTER: ICD-10-CM

## 2024-01-25 PROCEDURE — 87660 TRICHOMONAS VAGIN DIR PROBE: CPT

## 2024-01-25 PROCEDURE — 0501F PRENATAL FLOW SHEET: CPT | Performed by: OBSTETRICS & GYNECOLOGY

## 2024-01-25 PROCEDURE — 3078F DIAST BP <80 MM HG: CPT | Performed by: OBSTETRICS & GYNECOLOGY

## 2024-01-25 PROCEDURE — 87510 GARDNER VAG DNA DIR PROBE: CPT

## 2024-01-25 PROCEDURE — 87480 CANDIDA DNA DIR PROBE: CPT

## 2024-01-25 PROCEDURE — 3074F SYST BP LT 130 MM HG: CPT | Performed by: OBSTETRICS & GYNECOLOGY

## 2024-01-25 PROCEDURE — 87491 CHLMYD TRACH DNA AMP PROBE: CPT

## 2024-01-25 PROCEDURE — 88175 CYTOPATH C/V AUTO FLUID REDO: CPT

## 2024-01-25 PROCEDURE — 87591 N.GONORRHOEAE DNA AMP PROB: CPT

## 2024-01-25 ASSESSMENT — EDINBURGH POSTNATAL DEPRESSION SCALE (EPDS)
THINGS HAVE BEEN GETTING ON TOP OF ME: NO, I HAVE BEEN COPING AS WELL AS EVER
I HAVE BEEN SO UNHAPPY THAT I HAVE HAD DIFFICULTY SLEEPING: NOT AT ALL
I HAVE FELT SAD OR MISERABLE: NO, NOT AT ALL
I HAVE BEEN ANXIOUS OR WORRIED FOR NO GOOD REASON: NO, NOT AT ALL
I HAVE BEEN SO UNHAPPY THAT I HAVE BEEN CRYING: NO, NEVER
TOTAL SCORE: 1
THE THOUGHT OF HARMING MYSELF HAS OCCURRED TO ME: NEVER
I HAVE FELT SCARED OR PANICKY FOR NO GOOD REASON: NO, NOT AT ALL
I HAVE LOOKED FORWARD WITH ENJOYMENT TO THINGS: AS MUCH AS I EVER DID
I HAVE BEEN ABLE TO LAUGH AND SEE THE FUNNY SIDE OF THINGS: AS MUCH AS I ALWAYS COULD
I HAVE BLAMED MYSELF UNNECESSARILY WHEN THINGS WENT WRONG: NOT VERY OFTEN

## 2024-01-25 ASSESSMENT — FIBROSIS 4 INDEX: FIB4 SCORE: 0.47

## 2024-01-25 NOTE — PROGRESS NOTES
Establish Pregnancy Visit    CC: First OB Visit    HPI: Patient is a 23 y.o.  at 20w2d by LMP who presents for her first OB visit.  She is not yet feeling fetal movement.  She reports a small amount of bleeding at the beginning of pregnancy.  She denies any recent bleeding or abnormal vaginal discharge.  She has not been able to get an ultrasound yet this pregnancy.  She called to schedule an appointment, but there was a really long wait.      Procedure:  Abdominal US performed by me and per my read:    Indication: Amenorrhea, +UPT.     Findings: Single intrauterine pregnancy, femur length consistent with 20w6d with APURVA 2024, positive fetal cardiac activity, positive fetal movement.     Impression:   Viable IUP @ 20w6d by femur length   APURVA by LMP: 2024  Final APURVA: 2024    GYN HX:   Last Pap: never had, collected today   Hx Moderate or Severe Dysplasia : no  Hx STD : yes - gonorrhea and chlamydia    OBSTETRIC HISTORY:  OB History    Para Term  AB Living   1 0 0 0 0 0   SAB IAB Ectopic Molar Multiple Live Births   0 0 0 0 0 0      # Outcome Date GA Lbr Emiliano/2nd Weight Sex Delivery Anes PTL Lv   1 Current                MEDICAL HISTORY:  History reviewed. No pertinent past medical history.    MEDICATIONS:  Current Outpatient Medications on File Prior to Visit   Medication Sig Dispense Refill    Prenatal Vit-Fe Fumarate-FA (PRENATAL PO) Take  by mouth.       No current facility-administered medications on file prior to visit.       FAMILY HISTORY:  Family History   Problem Relation Age of Onset    Hypertension Mother     No Known Problems Father     Cancer Maternal Grandfather     Cancer Other         non specific       SURGICAL HISTORY:  History reviewed. No pertinent surgical history.    ALLERGIES / REACTIONS:  No Known Allergies             SOCIAL HISTORY:   reports that she has quit smoking. Her smoking use included cigarettes. She has never used smokeless tobacco. She reports that  she does not currently use alcohol. She reports that she does not currently use drugs after having used the following drugs: Marijuana.    ROS:   Gen: no fevers or chills, no significant weight loss or gain  Respiratory:  no cough or dyspnea  Cardiac:  no chest pain, no palpitations, no syncope  Breast: no breast discharge, pain, lump or skin changes  Urinary: no dysuria   Psych: no depression or anxiety  Neuro: no migraines with aura, fainting spells, numbness or tingling  Extremities: no joint pain, persistently swollen ankles, recurrent leg cramps     PHYSICAL EXAMINATION:  Vital Signs:   Vitals:    01/25/24 1021   BP: 122/63   Weight: 229 lb     Body mass index is 32.86 kg/m².  Constitutional: The patient is well developed and well nourished.  Psychiatric: Patient is oriented to time place and person.   Skin: No rash observed.  Neck: Neck appears symmetric. There are no masses or adenopathy present.  Respiratory: normal effort  Abdomen: Soft, non-tender.  Pelvic:    Vulva: normal.    Urethra: normal.   Vagina: normal.    Cervix: normal.    Uterus: consistent with dates    Adnexa: normal.   Perineum: normal.   GC / Chlamydia cultures obtained.   Pap Smear Obtained: yes  Extremeties: Legs are symmetric and without tenderness. There is no edema present.    ACOG SCREENING  Infection Prevention  1. High Risk For HIV: No 6. Rash Or Illness Since LMP: No     2. High Risk For Hepatitis B or C: No 7. History Of STD, GC, Chlamydia, HPV Syphilis: No     3. Live With Someone With TB Or Exposed To TB: No 8. Have a cat in the home?: Yes     4. Patient Or Partner Has A History Of Herpes: No 8a. Responsible for changing the litter?: No     5. History of Chicken Pox: No 9. Other (See Comments Below): No            Genetic Screening/Teratology Counseling- Includes patient, baby's father, or anyone in either family with:  Patient's age 35 years or older as of estimated date of delivery: No     Thalassemia (Grenadian, Ethiopian,  Mediterranean, or  background): MCV less than 80: No     Neural tube defect (Meningomyelocele, Spina bifida, or Anencephaly): No     Congenital heart defect: No     Down syndrome: No     Romie-Sachs (Ashkenazi Anabaptism, Cajun, Puerto Rican Montmorency): No     Canavan disease (Ashkenazi Anabaptism): No     Familial dysautonomia (Ashkenazi Anabaptism): No     Sickle cell disease or trait (): No     Hemophilia or other blood disorders: No     Muscular dystrophy: No    Cystic fibrosis: No     Andrew's chorea: No     Mental retardation/autism: No     Other inherited genetic or chromosomal disorder: No     Maternal metabolic disorder (eg. Type 1 diabetes, PKU): No     Patient or baby's father had child with birth defects not listed above: No     Recurrent pregnancy loss, or a stillbirth: No     Medications (including supplements, vitamins, herbs, or OTC drugs)/illicit/recreational drugs/alcohol since last menstrual period: No                 ASSESSMENT AND PLAN:  23 y.o.  at 20w2d     1. Primigravida in second trimester  - THINPREP RFLX HPV ASCUS W/CTNG; Future  - PREG CNTR PRENATAL PN; Future  - URINE DRUG SCREEN W/CONF (AR); Future  - VAGINAL PATHOGENS DNA PANEL; Future  - US-OB 2ND 3RD TRI COMPLETE; Future    - PNL ordered and std screening completed   - Dating reviewed: Dated by LMP c/w femur length today, anatomy scan ordered for final dating  - Discussed options for genetic/aneuploidy testing and information given for pt to consider.  Advised to call insurance for cost of testing.           - she currently desires aneuploidy testing.           - she currently desires CF/SMA testing.  - Discussed office policies, prenatal care timeline, weight gain, diet and activity.  - Taking PNV.  - Increase water intake and encouraged healthy nutrition. Encouraged moderate exercise may continue into final trimester.     Return in 4 weeks for next prenatal visit    Claire Flores M.D.

## 2024-01-25 NOTE — PROGRESS NOTES
Pt here for New OB  DUB today  LMP 9/5/23  Last PAP unsure  Genetic testing not interested  EPDS: 1  Pt states no complaints

## 2024-01-26 LAB
CANDIDA DNA VAG QL PROBE+SIG AMP: POSITIVE
G VAGINALIS DNA VAG QL PROBE+SIG AMP: POSITIVE
T VAGINALIS DNA VAG QL PROBE+SIG AMP: NEGATIVE

## 2024-01-29 LAB
C TRACH RRNA CVX QL NAA+PROBE: NEGATIVE
COMMENT NL11729A: NORMAL
CYTOLOGIST CVX/VAG CYTO: NORMAL
CYTOLOGY CVX/VAG DOC CYTO: NORMAL
CYTOLOGY CVX/VAG DOC THIN PREP: NORMAL
N GONORRHOEA RRNA CVX QL NAA+PROBE: NEGATIVE
NOTE NL11727A: NORMAL
OTHER STN SPEC: NORMAL
STAT OF ADQ CVX/VAG CYTO-IMP: NORMAL

## 2024-01-31 DIAGNOSIS — N76.0 BV (BACTERIAL VAGINOSIS): ICD-10-CM

## 2024-01-31 DIAGNOSIS — B96.89 BV (BACTERIAL VAGINOSIS): ICD-10-CM

## 2024-01-31 RX ORDER — METRONIDAZOLE 500 MG/1
500 TABLET ORAL 2 TIMES DAILY
Qty: 14 TABLET | Refills: 0 | Status: SHIPPED | OUTPATIENT
Start: 2024-01-31 | End: 2024-02-07

## 2024-02-05 ENCOUNTER — HOSPITAL ENCOUNTER (OUTPATIENT)
Dept: RADIOLOGY | Facility: MEDICAL CENTER | Age: 24
End: 2024-02-05
Attending: OBSTETRICS & GYNECOLOGY
Payer: COMMERCIAL

## 2024-02-05 ENCOUNTER — HOSPITAL ENCOUNTER (OUTPATIENT)
Dept: LAB | Facility: MEDICAL CENTER | Age: 24
End: 2024-02-05
Attending: OBSTETRICS & GYNECOLOGY
Payer: COMMERCIAL

## 2024-02-05 DIAGNOSIS — Z34.02 PRIMIGRAVIDA IN SECOND TRIMESTER: ICD-10-CM

## 2024-02-05 LAB
ABO GROUP BLD: NORMAL
BLD GP AB SCN SERPL QL: NORMAL
ERYTHROCYTE [DISTWIDTH] IN BLOOD BY AUTOMATED COUNT: 46.8 FL (ref 35.9–50)
HBV SURFACE AG SER QL: ABNORMAL
HCT VFR BLD AUTO: 44 % (ref 37–47)
HCV AB SER QL: ABNORMAL
HGB BLD-MCNC: 15.4 G/DL (ref 12–16)
HIV 1+2 AB+HIV1 P24 AG SERPL QL IA: NORMAL
MCH RBC QN AUTO: 32.4 PG (ref 27–33)
MCHC RBC AUTO-ENTMCNC: 35 G/DL (ref 32.2–35.5)
MCV RBC AUTO: 92.6 FL (ref 81.4–97.8)
PLATELET # BLD AUTO: 248 K/UL (ref 164–446)
PMV BLD AUTO: 11.1 FL (ref 9–12.9)
RBC # BLD AUTO: 4.75 M/UL (ref 4.2–5.4)
RH BLD: NORMAL
RUBV AB SER QL: 438 IU/ML
T PALLIDUM AB SER QL IA: ABNORMAL
WBC # BLD AUTO: 13.1 K/UL (ref 4.8–10.8)

## 2024-02-05 PROCEDURE — 86762 RUBELLA ANTIBODY: CPT

## 2024-02-05 PROCEDURE — 87389 HIV-1 AG W/HIV-1&-2 AB AG IA: CPT

## 2024-02-05 PROCEDURE — 86803 HEPATITIS C AB TEST: CPT

## 2024-02-05 PROCEDURE — 87086 URINE CULTURE/COLONY COUNT: CPT

## 2024-02-05 PROCEDURE — 80307 DRUG TEST PRSMV CHEM ANLYZR: CPT

## 2024-02-05 PROCEDURE — 86900 BLOOD TYPING SEROLOGIC ABO: CPT

## 2024-02-05 PROCEDURE — 87340 HEPATITIS B SURFACE AG IA: CPT

## 2024-02-05 PROCEDURE — 76805 OB US >/= 14 WKS SNGL FETUS: CPT

## 2024-02-05 PROCEDURE — 86780 TREPONEMA PALLIDUM: CPT

## 2024-02-05 PROCEDURE — 86901 BLOOD TYPING SEROLOGIC RH(D): CPT

## 2024-02-05 PROCEDURE — 36415 COLL VENOUS BLD VENIPUNCTURE: CPT

## 2024-02-05 PROCEDURE — 86850 RBC ANTIBODY SCREEN: CPT

## 2024-02-05 PROCEDURE — 85027 COMPLETE CBC AUTOMATED: CPT

## 2024-02-06 ENCOUNTER — TELEPHONE (OUTPATIENT)
Dept: OBGYN | Facility: CLINIC | Age: 24
End: 2024-02-06
Payer: COMMERCIAL

## 2024-02-06 LAB
AMPHET CTO UR CFM-MCNC: NEGATIVE NG/ML
BARBITURATES CTO UR CFM-MCNC: NEGATIVE NG/ML
BENZODIAZ CTO UR CFM-MCNC: NEGATIVE NG/ML
CANNABINOIDS CTO UR CFM-MCNC: NEGATIVE NG/ML
COCAINE CTO UR CFM-MCNC: NEGATIVE NG/ML
CREAT UR-MCNC: 132.6 MG/DL (ref 20–400)
DRUG COMMENT 753798: NORMAL
METHADONE CTO UR CFM-MCNC: NEGATIVE NG/ML
OPIATES CTO UR CFM-MCNC: NEGATIVE NG/ML
PCP CTO UR CFM-MCNC: NEGATIVE NG/ML
PROPOXYPH CTO UR CFM-MCNC: NEGATIVE NG/ML

## 2024-02-06 NOTE — TELEPHONE ENCOUNTER
----- Message from Claire Flores M.D. sent at 2/6/2024 10:38 AM PST -----  US is reassuring. She needs to schedule another prenatal appointment. I don't see one scheduled. Please help her schedule. Thanks.    2/6/2024 1111  Called pt and notified as above. Pt verbalized understanding. Scheduled ob f/u on 2/26/2024 at 0800 with Dr Flores. Pt agreed and verbalized understanding.

## 2024-02-07 LAB
BACTERIA UR CULT: NORMAL
SIGNIFICANT IND 70042: NORMAL
SITE SITE: NORMAL
SOURCE SOURCE: NORMAL

## 2024-02-12 LAB
Lab: NORMAL
NTRA 1P36 DELETION SYNDROME POPULATION-BASED RISK TEXT: NORMAL
NTRA 1P36 DELETION SYNDROME RESULT TEXT: NORMAL
NTRA 1P36 DELETION SYNDROME RISK SCORE TEXT: NORMAL
NTRA 22Q11.2 DELETION SYNDROME POPULATION-BASED RISK TEXT: NORMAL
NTRA 22Q11.2 DELETION SYNDROME RESULT TEXT: NORMAL
NTRA 22Q11.2 DELETION SYNDROME RISK SCORE TEXT: NORMAL
NTRA ANGELMAN SYNDROME POPULATION-BASED RISK TEXT: NORMAL
NTRA ANGELMAN SYNDROME RESULT TEXT: NORMAL
NTRA ANGELMAN SYNDROME RISK SCORE TEXT: NORMAL
NTRA CRI-DU-CHAT SYNDROME POPULATION-BASED RISK TEXT: NORMAL
NTRA CRI-DU-CHAT SYNDROME RESULT TEXT: NORMAL
NTRA CRI-DU-CHAT SYNDROME RISK SCORE TEXT: NORMAL
NTRA FETAL FRACTION: NORMAL
NTRA GENDER OF FETUS: NORMAL
NTRA MONOSOMY X AGE-BASED RISK TEXT: NORMAL
NTRA MONOSOMY X RESULT TEXT: NORMAL
NTRA MONOSOMY X RISK SCORE TEXT: NORMAL
NTRA PRADER-WILLI SYNDROME POPULATION-BASED RISK TEXT: NORMAL
NTRA PRADER-WILLI SYNDROME RESULT TEXT: NORMAL
NTRA PRADER-WILLI SYNDROME RISK SCORE TEXT: NORMAL
NTRA TRIPLOIDY RESULT TEXT: NORMAL
NTRA TRISOMY 13 AGE-BASED RISK TEXT: NORMAL
NTRA TRISOMY 13 RESULT TEXT: NORMAL
NTRA TRISOMY 13 RISK SCORE TEXT: NORMAL
NTRA TRISOMY 18 AGE-BASED RISK TEXT: NORMAL
NTRA TRISOMY 18 RESULT TEXT: NORMAL
NTRA TRISOMY 18 RISK SCORE TEXT: NORMAL
NTRA TRISOMY 21 AGE-BASED RISK TEXT: NORMAL
NTRA TRISOMY 21 RESULT TEXT: NORMAL
NTRA TRISOMY 21 RISK SCORE TEXT: NORMAL

## 2024-02-15 LAB
Lab: NEGATIVE
Lab: NORMAL
NTRA CYSTIC FIBROSIS: NEGATIVE
NTRA DUCHENNE/BECKER MUSCULAR DYSTROPHY: NEGATIVE
NTRA FRAGILE X SYNDROME: NEGATIVE
NTRA SPINAL MUSCULAR ATROPHY: NEGATIVE

## 2024-02-26 ENCOUNTER — ROUTINE PRENATAL (OUTPATIENT)
Dept: OBGYN | Facility: CLINIC | Age: 24
End: 2024-02-26
Payer: COMMERCIAL

## 2024-02-26 VITALS — DIASTOLIC BLOOD PRESSURE: 66 MMHG | WEIGHT: 240 LBS | BODY MASS INDEX: 34.44 KG/M2 | SYSTOLIC BLOOD PRESSURE: 116 MMHG

## 2024-02-26 DIAGNOSIS — Z34.02 PRIMIGRAVIDA IN SECOND TRIMESTER: Primary | ICD-10-CM

## 2024-02-26 PROCEDURE — 0502F SUBSEQUENT PRENATAL CARE: CPT | Performed by: OBSTETRICS & GYNECOLOGY

## 2024-02-26 PROCEDURE — 3078F DIAST BP <80 MM HG: CPT | Performed by: OBSTETRICS & GYNECOLOGY

## 2024-02-26 PROCEDURE — 3074F SYST BP LT 130 MM HG: CPT | Performed by: OBSTETRICS & GYNECOLOGY

## 2024-02-26 ASSESSMENT — FIBROSIS 4 INDEX: FIB4 SCORE: 0.46

## 2024-03-15 ENCOUNTER — HOSPITAL ENCOUNTER (OUTPATIENT)
Dept: LAB | Facility: MEDICAL CENTER | Age: 24
End: 2024-03-15
Attending: OBSTETRICS & GYNECOLOGY
Payer: COMMERCIAL

## 2024-03-15 DIAGNOSIS — Z34.02 PRIMIGRAVIDA IN SECOND TRIMESTER: ICD-10-CM

## 2024-03-15 LAB
BASOPHILS # BLD AUTO: 0.7 % (ref 0–1.8)
BASOPHILS # BLD: 0.09 K/UL (ref 0–0.12)
EOSINOPHIL # BLD AUTO: 0.21 K/UL (ref 0–0.51)
EOSINOPHIL NFR BLD: 1.7 % (ref 0–6.9)
ERYTHROCYTE [DISTWIDTH] IN BLOOD BY AUTOMATED COUNT: 44.6 FL (ref 35.9–50)
HCT VFR BLD AUTO: 38.9 % (ref 37–47)
HGB BLD-MCNC: 13.6 G/DL (ref 12–16)
IMM GRANULOCYTES # BLD AUTO: 0.05 K/UL (ref 0–0.11)
IMM GRANULOCYTES NFR BLD AUTO: 0.4 % (ref 0–0.9)
LYMPHOCYTES # BLD AUTO: 2.03 K/UL (ref 1–4.8)
LYMPHOCYTES NFR BLD: 16.8 % (ref 22–41)
MCH RBC QN AUTO: 32.7 PG (ref 27–33)
MCHC RBC AUTO-ENTMCNC: 35 G/DL (ref 32.2–35.5)
MCV RBC AUTO: 93.5 FL (ref 81.4–97.8)
MONOCYTES # BLD AUTO: 0.67 K/UL (ref 0–0.85)
MONOCYTES NFR BLD AUTO: 5.6 % (ref 0–13.4)
NEUTROPHILS # BLD AUTO: 9 K/UL (ref 1.82–7.42)
NEUTROPHILS NFR BLD: 74.8 % (ref 44–72)
NRBC # BLD AUTO: 0 K/UL
NRBC BLD-RTO: 0 /100 WBC (ref 0–0.2)
PLATELET # BLD AUTO: 246 K/UL (ref 164–446)
PMV BLD AUTO: 10.8 FL (ref 9–12.9)
RBC # BLD AUTO: 4.16 M/UL (ref 4.2–5.4)
WBC # BLD AUTO: 12.1 K/UL (ref 4.8–10.8)

## 2024-03-15 PROCEDURE — 86780 TREPONEMA PALLIDUM: CPT

## 2024-03-15 PROCEDURE — 36415 COLL VENOUS BLD VENIPUNCTURE: CPT

## 2024-03-15 PROCEDURE — 85025 COMPLETE CBC W/AUTO DIFF WBC: CPT

## 2024-03-15 PROCEDURE — 82950 GLUCOSE TEST: CPT

## 2024-03-16 LAB
GLUCOSE 1H P 50 G GLC PO SERPL-MCNC: 81 MG/DL (ref 70–139)
T PALLIDUM AB SER QL IA: NORMAL

## 2024-03-18 ENCOUNTER — ROUTINE PRENATAL (OUTPATIENT)
Dept: OBGYN | Facility: CLINIC | Age: 24
End: 2024-03-18
Payer: COMMERCIAL

## 2024-03-18 VITALS — DIASTOLIC BLOOD PRESSURE: 66 MMHG | WEIGHT: 245.8 LBS | SYSTOLIC BLOOD PRESSURE: 133 MMHG | BODY MASS INDEX: 35.27 KG/M2

## 2024-03-18 DIAGNOSIS — Z34.92 SECOND TRIMESTER PREGNANCY: ICD-10-CM

## 2024-03-18 PROCEDURE — 3075F SYST BP GE 130 - 139MM HG: CPT | Performed by: OBSTETRICS & GYNECOLOGY

## 2024-03-18 PROCEDURE — 3078F DIAST BP <80 MM HG: CPT | Performed by: OBSTETRICS & GYNECOLOGY

## 2024-03-18 PROCEDURE — 0502F SUBSEQUENT PRENATAL CARE: CPT | Performed by: OBSTETRICS & GYNECOLOGY

## 2024-03-18 ASSESSMENT — FIBROSIS 4 INDEX: FIB4 SCORE: 0.47

## 2024-03-18 NOTE — LETTER
"Count Your Baby's Movements  Another step to a healthy delivery    Elizabeth Delgado  [facility]  Dept: [phone]    How Many Weeks Pregnant: 27w6d    Date to Begin Counting: 3/19/24              How to use this chart    One way for your physician to keep track of your baby's health is by knowing how often the baby moves (or \"kicks\") in your womb.  You can help your physician to do this by using this chart every day.    Every day, you should see how many hours it takes for your baby to move 10 times.  Start in the morning, as soon as you get up.    First, write down the time your baby moves until you get to 10.  Check off one box every time your baby moves until you get to 10.  Write down the time you finished counting in the last column.  Total how long it took to count up all 10 movements.  Finally, fill in the box that shows how long this took.  After counting 10 movements, you no longer have to count any more that day.  The next morning, just start counting again as soon as you get up.    What should you call a \"movement\"?  It is hard to say, because it will feel different from one mother to another and from one pregnancy to the next.  The important thing is that you count the movements the same way throughout your pregnancy.  If you have more questions, you should ask your physician.    Count carefully every day!  SAMPLE:  Week 28    How many hours did it take to feel 10 movements?       Start  Time     1     2     3     4     5     6     7     8     9     10   Finish Time   Mon 8:20           11:40   Tue Wed Thu Fri               Sat               Sun                 IMPORTANT: You should contact your physician if it takes more than two hours for you to feel 10 movements.  Each morning, write down the time and start to count the movements of your baby.  Keep track by checking off one box every time you feel one movement.  When you have " "felt 10 \"kicks\", write down the time you finished counting in the last column.  Then fill in the   box (over the check radha) for the number of hours it took.  Be sure to read the complete instructions on the previous page.            "

## 2024-04-01 NOTE — PROGRESS NOTES
Pt here today for OB follow up  Pt states this morning she experienced some pain on the right side of her abdomen that lasted about 20 minutes   Reports +  Good # [phone]  Pharmacy Confirmed.  Chaperone offered and not indicated.   Pt declines TDAP  Pt declines BTL

## 2024-04-02 ENCOUNTER — APPOINTMENT (OUTPATIENT)
Dept: OBGYN | Facility: CLINIC | Age: 24
End: 2024-04-02
Payer: COMMERCIAL

## 2024-04-02 VITALS — DIASTOLIC BLOOD PRESSURE: 71 MMHG | BODY MASS INDEX: 35.44 KG/M2 | WEIGHT: 247 LBS | SYSTOLIC BLOOD PRESSURE: 120 MMHG

## 2024-04-02 DIAGNOSIS — O26.843 UTERINE SIZE DATE DISCREPANCY PREGNANCY, THIRD TRIMESTER: ICD-10-CM

## 2024-04-02 PROCEDURE — 3078F DIAST BP <80 MM HG: CPT | Performed by: ADVANCED PRACTICE MIDWIFE

## 2024-04-02 PROCEDURE — 3074F SYST BP LT 130 MM HG: CPT | Performed by: ADVANCED PRACTICE MIDWIFE

## 2024-04-02 PROCEDURE — 0502F SUBSEQUENT PRENATAL CARE: CPT | Performed by: ADVANCED PRACTICE MIDWIFE

## 2024-04-02 ASSESSMENT — FIBROSIS 4 INDEX: FIB4 SCORE: 0.47

## 2024-04-02 NOTE — PROGRESS NOTES
Pt is a 23 y.o.   at 30w0d  gestation here for ARSH visit. She reports affirms fetal movement. Denies vaginal bleeding and denies cramping/regular contractions. She reports overall today she is feeling well but woke up this morning feeling some pain on right flank. She is unsure if she slept wrong. No recent ER visits since last seen. Discussed comfort measures, likely sleeping position. Growth US ordered for S>D Adequate hydration reviewed in detail with patient. Precautions and warning signs reviewed with patient.  RTC in 2 week(s) for ARSH visit.

## 2024-04-17 ENCOUNTER — ROUTINE PRENATAL (OUTPATIENT)
Dept: OBGYN | Facility: CLINIC | Age: 24
End: 2024-04-17
Payer: COMMERCIAL

## 2024-04-17 VITALS — DIASTOLIC BLOOD PRESSURE: 75 MMHG | SYSTOLIC BLOOD PRESSURE: 127 MMHG | WEIGHT: 254 LBS | BODY MASS INDEX: 36.45 KG/M2

## 2024-04-17 DIAGNOSIS — Z34.03 PRIMIGRAVIDA IN THIRD TRIMESTER: Primary | ICD-10-CM

## 2024-04-17 PROCEDURE — 3074F SYST BP LT 130 MM HG: CPT | Performed by: OBSTETRICS & GYNECOLOGY

## 2024-04-17 PROCEDURE — 3078F DIAST BP <80 MM HG: CPT | Performed by: OBSTETRICS & GYNECOLOGY

## 2024-04-17 PROCEDURE — 0502F SUBSEQUENT PRENATAL CARE: CPT | Performed by: OBSTETRICS & GYNECOLOGY

## 2024-04-17 ASSESSMENT — FIBROSIS 4 INDEX: FIB4 SCORE: 0.47

## 2024-04-17 NOTE — PROGRESS NOTES
Pt here today for OBFV   +FM movemnet  No VB, LOF. 's   Phone number [phone] (home)   Pharmacy verified     Pt states no questions or concerns  TDAP declined

## 2024-04-25 ENCOUNTER — HOSPITAL ENCOUNTER (OUTPATIENT)
Dept: RADIOLOGY | Facility: MEDICAL CENTER | Age: 24
End: 2024-04-25
Attending: ADVANCED PRACTICE MIDWIFE
Payer: COMMERCIAL

## 2024-04-25 DIAGNOSIS — O26.843 UTERINE SIZE DATE DISCREPANCY PREGNANCY, THIRD TRIMESTER: ICD-10-CM

## 2024-04-25 PROCEDURE — 76816 OB US FOLLOW-UP PER FETUS: CPT

## 2024-04-30 ENCOUNTER — ROUTINE PRENATAL (OUTPATIENT)
Dept: OBGYN | Facility: CLINIC | Age: 24
End: 2024-04-30
Payer: COMMERCIAL

## 2024-04-30 VITALS — BODY MASS INDEX: 37.28 KG/M2 | WEIGHT: 259.8 LBS | SYSTOLIC BLOOD PRESSURE: 127 MMHG | DIASTOLIC BLOOD PRESSURE: 76 MMHG

## 2024-04-30 DIAGNOSIS — Z34.92 SECOND TRIMESTER PREGNANCY: ICD-10-CM

## 2024-04-30 ASSESSMENT — FIBROSIS 4 INDEX: FIB4 SCORE: 0.47

## 2024-05-14 ENCOUNTER — ROUTINE PRENATAL (OUTPATIENT)
Dept: OBGYN | Facility: CLINIC | Age: 24
End: 2024-05-14
Payer: COMMERCIAL

## 2024-05-14 ENCOUNTER — HOSPITAL ENCOUNTER (OUTPATIENT)
Facility: MEDICAL CENTER | Age: 24
End: 2024-05-14
Attending: OBSTETRICS & GYNECOLOGY
Payer: COMMERCIAL

## 2024-05-14 VITALS — BODY MASS INDEX: 36.8 KG/M2 | SYSTOLIC BLOOD PRESSURE: 114 MMHG | DIASTOLIC BLOOD PRESSURE: 67 MMHG | WEIGHT: 256.5 LBS

## 2024-05-14 DIAGNOSIS — Z34.03 ENCOUNTER FOR SUPERVISION OF NORMAL FIRST PREGNANCY IN THIRD TRIMESTER: ICD-10-CM

## 2024-05-14 DIAGNOSIS — Z34.03 ENCOUNTER FOR SUPERVISION OF NORMAL FIRST PREGNANCY IN THIRD TRIMESTER: Primary | ICD-10-CM

## 2024-05-14 PROCEDURE — 3074F SYST BP LT 130 MM HG: CPT | Performed by: OBSTETRICS & GYNECOLOGY

## 2024-05-14 PROCEDURE — 3078F DIAST BP <80 MM HG: CPT | Performed by: OBSTETRICS & GYNECOLOGY

## 2024-05-14 PROCEDURE — 0502F SUBSEQUENT PRENATAL CARE: CPT | Performed by: OBSTETRICS & GYNECOLOGY

## 2024-05-14 ASSESSMENT — FIBROSIS 4 INDEX: FIB4 SCORE: 0.47

## 2024-05-14 NOTE — PROGRESS NOTES
Pt. Here for OB/FU.     Reports Good FM.   Pt. Denies VB, LOF, or UC's.     GBS today.     Pt states no complaints.     Phone/Pharm verified.

## 2024-05-15 LAB — GP B STREP DNA SPEC QL NAA+PROBE: POSITIVE

## 2024-05-16 PROBLEM — O99.820 GBS (GROUP B STREPTOCOCCUS CARRIER), +RV CULTURE, CURRENTLY PREGNANT: Status: ACTIVE | Noted: 2024-05-16

## 2024-05-20 ENCOUNTER — ROUTINE PRENATAL (OUTPATIENT)
Dept: OBGYN | Facility: CLINIC | Age: 24
End: 2024-05-20
Payer: COMMERCIAL

## 2024-05-20 VITALS — SYSTOLIC BLOOD PRESSURE: 121 MMHG | WEIGHT: 254 LBS | BODY MASS INDEX: 36.45 KG/M2 | DIASTOLIC BLOOD PRESSURE: 73 MMHG

## 2024-05-20 DIAGNOSIS — Z34.03 ENCOUNTER FOR SUPERVISION OF NORMAL FIRST PREGNANCY IN THIRD TRIMESTER: ICD-10-CM

## 2024-05-20 PROCEDURE — 3078F DIAST BP <80 MM HG: CPT | Performed by: STUDENT IN AN ORGANIZED HEALTH CARE EDUCATION/TRAINING PROGRAM

## 2024-05-20 PROCEDURE — 3074F SYST BP LT 130 MM HG: CPT | Performed by: STUDENT IN AN ORGANIZED HEALTH CARE EDUCATION/TRAINING PROGRAM

## 2024-05-20 PROCEDURE — 0502F SUBSEQUENT PRENATAL CARE: CPT | Performed by: STUDENT IN AN ORGANIZED HEALTH CARE EDUCATION/TRAINING PROGRAM

## 2024-05-20 RX ORDER — BREAST PUMP
EACH MISCELLANEOUS
Qty: 1 EACH | Refills: 0 | Status: ON HOLD | OUTPATIENT
Start: 2024-05-20

## 2024-05-20 ASSESSMENT — FIBROSIS 4 INDEX: FIB4 SCORE: 0.47

## 2024-05-20 NOTE — LETTER
May 20, 2024    Patient: Elizabeth Delgado  YOB: 2000  Date of Visit: 5/20/2024    To Whom It May Concern:    Elizabeth Delgado is being seen and treated with [facility]. She is currently as of today 36 weeks and 6 days.  Sincerely,      5/20/2024  NPI: [id number]  Dayanna Castillo PA-C

## 2024-05-20 NOTE — PROGRESS NOTES
"S: 23 y.o.  at 36w6d presents for routine obstetric follow-up.   Good fetal movement.  No contractions, vaginal bleeding, or leakage of fluid.    Questions answered.    O: LMP 2023   Patients' weight gain, fluid intake and exercise level discussed.  Vitals, fundal height , fetal position, and FHR reviewed on flowsheet    Lab:  Recent Results (from the past 336 hour(s))   GRP B STREP, BY PCR ([facility])    Collection Time: 24  3:47 PM    Specimen: Genital   Result Value Ref Range    Strep Gp B DNA PCR POSITIVE (A) Negative     Recent US: 2024: VERTEX lie; SOLITARIO\" 18.31; FHR: 142 bpm; EFW: 49%, 2219g  TDaP: Declines, education provided  Flu: educated  RSV: Educated on RSV vaccine at 32-36w  GBS: Positive  Rh: positive  BTL: declines    A/P:  23 y.o.  at 36w6d presents for routine obstetric follow-up.  Size equals dates and/or scan    - GBS positive  - Breast pump prescription given to patient  - Continue prenatal vitamins- pills not gummies.  - Fetal kick counts.  - Exercise at least 30 minutes daily. Drink at least 3-4L of water daily  - PTL precautions educated.    Follow-up in 1 weeks.    Dayanna Castillo [email or web address].  [facility]   "

## 2024-05-28 ENCOUNTER — HOSPITAL ENCOUNTER (INPATIENT)
Facility: MEDICAL CENTER | Age: 24
LOS: 4 days | End: 2024-06-01
Attending: OBSTETRICS & GYNECOLOGY | Admitting: OBSTETRICS & GYNECOLOGY
Payer: COMMERCIAL

## 2024-05-28 DIAGNOSIS — Z98.891 S/P CESAREAN SECTION: ICD-10-CM

## 2024-05-28 LAB
A1 MICROGLOB PLACENTAL VAG QL: POSITIVE
BASOPHILS # BLD AUTO: 0.5 % (ref 0–1.8)
BASOPHILS # BLD: 0.06 K/UL (ref 0–0.12)
CRYSTALS AMN MICRO: NORMAL
EOSINOPHIL # BLD AUTO: 0.18 K/UL (ref 0–0.51)
EOSINOPHIL NFR BLD: 1.4 % (ref 0–6.9)
ERYTHROCYTE [DISTWIDTH] IN BLOOD BY AUTOMATED COUNT: 44.4 FL (ref 35.9–50)
HCT VFR BLD AUTO: 41.3 % (ref 37–47)
HGB BLD-MCNC: 14.5 G/DL (ref 12–16)
HOLDING TUBE BB 8507: NORMAL
IMM GRANULOCYTES # BLD AUTO: 0.08 K/UL (ref 0–0.11)
IMM GRANULOCYTES NFR BLD AUTO: 0.6 % (ref 0–0.9)
LYMPHOCYTES # BLD AUTO: 1.97 K/UL (ref 1–4.8)
LYMPHOCYTES NFR BLD: 15.3 % (ref 22–41)
MCH RBC QN AUTO: 31.7 PG (ref 27–33)
MCHC RBC AUTO-ENTMCNC: 35.1 G/DL (ref 32.2–35.5)
MCV RBC AUTO: 90.2 FL (ref 81.4–97.8)
MONOCYTES # BLD AUTO: 0.81 K/UL (ref 0–0.85)
MONOCYTES NFR BLD AUTO: 6.3 % (ref 0–13.4)
NEUTROPHILS # BLD AUTO: 9.78 K/UL (ref 1.82–7.42)
NEUTROPHILS NFR BLD: 75.9 % (ref 44–72)
NRBC # BLD AUTO: 0 K/UL
NRBC BLD-RTO: 0 /100 WBC (ref 0–0.2)
PLATELET # BLD AUTO: 237 K/UL (ref 164–446)
PMV BLD AUTO: 10.7 FL (ref 9–12.9)
RBC # BLD AUTO: 4.58 M/UL (ref 4.2–5.4)
T PALLIDUM AB SER QL IA: NORMAL
WBC # BLD AUTO: 12.9 K/UL (ref 4.8–10.8)

## 2024-05-28 PROCEDURE — 36415 COLL VENOUS BLD VENIPUNCTURE: CPT

## 2024-05-28 PROCEDURE — 700101 HCHG RX REV CODE 250

## 2024-05-28 PROCEDURE — 86780 TREPONEMA PALLIDUM: CPT

## 2024-05-28 PROCEDURE — 700105 HCHG RX REV CODE 258

## 2024-05-28 PROCEDURE — 700111 HCHG RX REV CODE 636 W/ 250 OVERRIDE (IP): Mod: JZ

## 2024-05-28 PROCEDURE — 85025 COMPLETE CBC W/AUTO DIFF WBC: CPT

## 2024-05-28 PROCEDURE — 700102 HCHG RX REV CODE 250 W/ 637 OVERRIDE(OP)

## 2024-05-28 PROCEDURE — 89060 EXAM SYNOVIAL FLUID CRYSTALS: CPT

## 2024-05-28 PROCEDURE — 302449 STATCHG TRIAGE ONLY (STATISTIC)

## 2024-05-28 PROCEDURE — A9270 NON-COVERED ITEM OR SERVICE: HCPCS

## 2024-05-28 PROCEDURE — 84112 EVAL AMNIOTIC FLUID PROTEIN: CPT

## 2024-05-28 PROCEDURE — 770002 HCHG ROOM/CARE - OB PRIVATE (112)

## 2024-05-28 RX ORDER — ONDANSETRON 2 MG/ML
4 INJECTION INTRAMUSCULAR; INTRAVENOUS EVERY 6 HOURS PRN
Status: DISCONTINUED | OUTPATIENT
Start: 2024-05-28 | End: 2024-05-29 | Stop reason: HOSPADM

## 2024-05-28 RX ORDER — ONDANSETRON 4 MG/1
4 TABLET, ORALLY DISINTEGRATING ORAL EVERY 6 HOURS PRN
Status: DISCONTINUED | OUTPATIENT
Start: 2024-05-28 | End: 2024-05-29 | Stop reason: HOSPADM

## 2024-05-28 RX ORDER — OXYTOCIN 10 [USP'U]/ML
10 INJECTION, SOLUTION INTRAMUSCULAR; INTRAVENOUS
Status: DISCONTINUED | OUTPATIENT
Start: 2024-05-28 | End: 2024-05-29 | Stop reason: HOSPADM

## 2024-05-28 RX ORDER — SODIUM CHLORIDE, SODIUM LACTATE, POTASSIUM CHLORIDE, CALCIUM CHLORIDE 600; 310; 30; 20 MG/100ML; MG/100ML; MG/100ML; MG/100ML
INJECTION, SOLUTION INTRAVENOUS CONTINUOUS
Status: DISCONTINUED | OUTPATIENT
Start: 2024-05-28 | End: 2024-06-01 | Stop reason: HOSPADM

## 2024-05-28 RX ORDER — LIDOCAINE HYDROCHLORIDE 10 MG/ML
20 INJECTION, SOLUTION INFILTRATION; PERINEURAL
Status: DISCONTINUED | OUTPATIENT
Start: 2024-05-28 | End: 2024-05-29 | Stop reason: HOSPADM

## 2024-05-28 RX ORDER — TERBUTALINE SULFATE 1 MG/ML
0.25 INJECTION, SOLUTION SUBCUTANEOUS
Status: DISCONTINUED | OUTPATIENT
Start: 2024-05-28 | End: 2024-05-29 | Stop reason: HOSPADM

## 2024-05-28 RX ORDER — ACETAMINOPHEN 500 MG
1000 TABLET ORAL
Status: COMPLETED | OUTPATIENT
Start: 2024-05-28 | End: 2024-05-28

## 2024-05-28 RX ORDER — IBUPROFEN 800 MG/1
800 TABLET ORAL
Status: DISCONTINUED | OUTPATIENT
Start: 2024-05-28 | End: 2024-05-29 | Stop reason: HOSPADM

## 2024-05-28 RX ADMIN — WATER 2.5 MILLION UNITS: 1 INJECTION INTRAMUSCULAR; INTRAVENOUS; SUBCUTANEOUS at 23:12

## 2024-05-28 RX ADMIN — ACETAMINOPHEN 1000 MG: 500 TABLET, FILM COATED ORAL at 19:42

## 2024-05-28 RX ADMIN — WATER 2.5 MILLION UNITS: 1 INJECTION INTRAMUSCULAR; INTRAVENOUS; SUBCUTANEOUS at 19:15

## 2024-05-28 RX ADMIN — SODIUM CHLORIDE, POTASSIUM CHLORIDE, SODIUM LACTATE AND CALCIUM CHLORIDE: 600; 310; 30; 20 INJECTION, SOLUTION INTRAVENOUS at 15:15

## 2024-05-28 RX ADMIN — SODIUM CHLORIDE 5 MILLION UNITS: 900 INJECTION INTRAVENOUS at 15:18

## 2024-05-28 RX ADMIN — OXYTOCIN 2 MILLI-UNITS/MIN: 10 INJECTION INTRAVENOUS at 16:17

## 2024-05-28 ASSESSMENT — LIFESTYLE VARIABLES
EVER FELT BAD OR GUILTY ABOUT YOUR DRINKING: NO
TOTAL SCORE: 0
ON A TYPICAL DAY WHEN YOU DRINK ALCOHOL HOW MANY DRINKS DO YOU HAVE: 0
AVERAGE NUMBER OF DAYS PER WEEK YOU HAVE A DRINK CONTAINING ALCOHOL: 0
TOTAL SCORE: 0
HOW MANY TIMES IN THE PAST YEAR HAVE YOU HAD 5 OR MORE DRINKS IN A DAY: 0
CONSUMPTION TOTAL: NEGATIVE
EVER HAD A DRINK FIRST THING IN THE MORNING TO STEADY YOUR NERVES TO GET RID OF A HANGOVER: NO
TOTAL SCORE: 0
ALCOHOL_USE: NO
HAVE YOU EVER FELT YOU SHOULD CUT DOWN ON YOUR DRINKING: NO
HAVE PEOPLE ANNOYED YOU BY CRITICIZING YOUR DRINKING: NO
DOES PATIENT WANT TO STOP DRINKING: NO

## 2024-05-28 ASSESSMENT — PAIN DESCRIPTION - PAIN TYPE: TYPE: ACUTE PAIN

## 2024-05-28 ASSESSMENT — FIBROSIS 4 INDEX: FIB4 SCORE: 0.47

## 2024-05-28 ASSESSMENT — PATIENT HEALTH QUESTIONNAIRE - PHQ9
SUM OF ALL RESPONSES TO PHQ9 QUESTIONS 1 AND 2: 0
2. FEELING DOWN, DEPRESSED, IRRITABLE, OR HOPELESS: NOT AT ALL
1. LITTLE INTEREST OR PLEASURE IN DOING THINGS: NOT AT ALL

## 2024-05-28 NOTE — H&P
OB H&P:    CC: LOF     HPI:  Ms. Elizabeth Delgado is a 23 y.o.  @ 38w0d by LMP presents for LOF at 0200 this morning, clear. No ctx or VB. +normal FM.     Contractions: No   Loss of fluid: Yes   Vaginal bleeding: No   Fetal movement: Present     PNC with [facility]. GBS positive in pregnancy, no other complications.   Last PO was 0900 today (eggs).   Desires unmedicated labor but open to epidural.     PNL:  Blood Type O positive, Rubella immune, HIV neg, TrepAb neg, HBsAg NR, GC/CT neg/neg  1 HR GTT: 81  GBS positive      ROS:  Const: denies fevers, general concerns  CV/resp: reports no concerns  GI: denies abd pain, GI concerns  : see HPI  Neuro: denies HA/vision changes    OB History    Para Term  AB Living   1 0 0 0 0 0   SAB IAB Ectopic Molar Multiple Live Births   0 0 0 0 0 0      # Outcome Date GA Lbr Emiliano/2nd Weight Sex Delivery Anes PTL Lv   1 Current                GYN: denies STIs, no cervical procedures    History reviewed. No pertinent past medical history.    History reviewed. No pertinent surgical history.    No current facility-administered medications on file prior to encounter.     Current Outpatient Medications on File Prior to Encounter   Medication Sig Dispense Refill    Prenatal Vit-Fe Fumarate-FA (PRENATAL PO) Take  by mouth.      Misc. Devices (BREAST PUMP) Misc As needed for breastfeeding or milk storage 1 Each 0       Family History   Problem Relation Age of Onset    Hypertension Mother     No Known Problems Father     Cancer Maternal Grandfather     Cancer Other         non specific       Social History     Tobacco Use    Smoking status: Former     Types: Cigarettes    Smokeless tobacco: Never    Tobacco comments:     Smoked in high school briefly.    Vaping Use    Vaping status: Former    Quit date: 10/1/2023    Substances: Nicotine   Substance Use Topics    Alcohol use: Not Currently    Drug use: Not Currently     Types: Marijuana         PE:  Vitals:  "   24 1200 24 1225 24 1234   BP:  129/67 129/67   Pulse:  (!) 103 (!) 103   Resp:   16   Temp:   36.3 °C (97.3 °F)   TempSrc:   Temporal   SpO2:  95% 95%   Weight: 116 kg (256 lb)     Height: 1.753 m (5' 9\")         GEN: AAO, NAD  HEENT: normocephalic, atraumatic, EOMI  CV: rrr, no m/r/g  Resp: CTAB, no w/r/r  Abd: soft, gravid, NT  Ext: NT, no peripheral edema  Derm: no visible lesions or rashes  Neuro: grossly intact    SVE: 2cm  FHT: Baseline 120/moderate variability/+ accels/ - decels  Shana: no contractions     A/P: 23 y.o.  @ 38w0d by LMP presents with SROM, admitted for labor mangement.     #SIUP @ 38w0d by LMP   -adequate PNL with [facility]   -GBS positive     #SROM  - Clear LOF @ 0200, negative fern with +amnisure   - Confirmed vertex position with bedside US  - labor: latent   - pitocin for augmentation   - pain: desires unmedicated but open to epidural     #Fetal status   - FHT: cat I    #GBS status  -GBS: positive  -Start treatment with PCN loading dose 5mu followed by 2.5mu q4h until delivery      #Rubella immune, HIV neg, TrepAb neg, HBsAg NR, GC/CT neg/neg    #healthcare maintenance   -TdaP declined      Ozzy Sanabria D.O.  PGY-1, [employer] [facility]      "

## 2024-05-28 NOTE — PROGRESS NOTES
1245: PT presented for c/o of SROM at 0200 this morning. Monitors applied, VSS, orders received. SVE completed see flowsheets  1400: Admission orders received  1900: Report given to Mario DESAI

## 2024-05-29 ENCOUNTER — ANESTHESIA (OUTPATIENT)
Dept: OBGYN | Facility: MEDICAL CENTER | Age: 24
End: 2024-05-29
Payer: COMMERCIAL

## 2024-05-29 ENCOUNTER — APPOINTMENT (OUTPATIENT)
Dept: OBGYN | Facility: CLINIC | Age: 24
End: 2024-05-29
Payer: COMMERCIAL

## 2024-05-29 ENCOUNTER — ANESTHESIA EVENT (OUTPATIENT)
Dept: OBGYN | Facility: MEDICAL CENTER | Age: 24
End: 2024-05-29
Payer: COMMERCIAL

## 2024-05-29 PROCEDURE — 700105 HCHG RX REV CODE 258

## 2024-05-29 PROCEDURE — 700105 HCHG RX REV CODE 258: Performed by: OBSTETRICS & GYNECOLOGY

## 2024-05-29 PROCEDURE — 303615 HCHG EPIDURAL/SPINAL ANESTHESIA FOR LABOR

## 2024-05-29 PROCEDURE — 700111 HCHG RX REV CODE 636 W/ 250 OVERRIDE (IP): Mod: JZ | Performed by: INTERNAL MEDICINE

## 2024-05-29 PROCEDURE — 160029 HCHG SURGERY MINUTES - 1ST 30 MINS LEVEL 4: Performed by: OBSTETRICS & GYNECOLOGY

## 2024-05-29 PROCEDURE — 59510 CESAREAN DELIVERY: CPT | Performed by: OBSTETRICS & GYNECOLOGY

## 2024-05-29 PROCEDURE — A9270 NON-COVERED ITEM OR SERVICE: HCPCS | Performed by: INTERNAL MEDICINE

## 2024-05-29 PROCEDURE — 700105 HCHG RX REV CODE 258: Performed by: PHYSICIAN ASSISTANT

## 2024-05-29 PROCEDURE — 160002 HCHG RECOVERY MINUTES (STAT): Performed by: OBSTETRICS & GYNECOLOGY

## 2024-05-29 PROCEDURE — 700101 HCHG RX REV CODE 250: Performed by: OBSTETRICS & GYNECOLOGY

## 2024-05-29 PROCEDURE — 160009 HCHG ANES TIME/MIN: Performed by: OBSTETRICS & GYNECOLOGY

## 2024-05-29 PROCEDURE — 160035 HCHG PACU - 1ST 60 MINS PHASE I: Performed by: OBSTETRICS & GYNECOLOGY

## 2024-05-29 PROCEDURE — 160048 HCHG OR STATISTICAL LEVEL 1-5: Performed by: OBSTETRICS & GYNECOLOGY

## 2024-05-29 PROCEDURE — 770002 HCHG ROOM/CARE - OB PRIVATE (112)

## 2024-05-29 PROCEDURE — 700102 HCHG RX REV CODE 250 W/ 637 OVERRIDE(OP): Performed by: INTERNAL MEDICINE

## 2024-05-29 PROCEDURE — C1755 CATHETER, INTRASPINAL: HCPCS | Performed by: OBSTETRICS & GYNECOLOGY

## 2024-05-29 PROCEDURE — 160041 HCHG SURGERY MINUTES - EA ADDL 1 MIN LEVEL 4: Performed by: OBSTETRICS & GYNECOLOGY

## 2024-05-29 PROCEDURE — 700101 HCHG RX REV CODE 250

## 2024-05-29 PROCEDURE — 700111 HCHG RX REV CODE 636 W/ 250 OVERRIDE (IP): Mod: JZ | Performed by: OBSTETRICS & GYNECOLOGY

## 2024-05-29 PROCEDURE — 700111 HCHG RX REV CODE 636 W/ 250 OVERRIDE (IP): Mod: JZ

## 2024-05-29 PROCEDURE — 59514 CESAREAN DELIVERY ONLY: CPT | Mod: AS | Performed by: PHYSICIAN ASSISTANT

## 2024-05-29 PROCEDURE — 700105 HCHG RX REV CODE 258: Performed by: INTERNAL MEDICINE

## 2024-05-29 RX ORDER — HYDROMORPHONE HYDROCHLORIDE 1 MG/ML
0.2 INJECTION, SOLUTION INTRAMUSCULAR; INTRAVENOUS; SUBCUTANEOUS
Status: ACTIVE | OUTPATIENT
Start: 2024-05-29 | End: 2024-05-30

## 2024-05-29 RX ORDER — ROPIVACAINE HYDROCHLORIDE 2 MG/ML
INJECTION, SOLUTION EPIDURAL; INFILTRATION; PERINEURAL
Status: ACTIVE
Start: 2024-05-29 | End: 2024-05-30

## 2024-05-29 RX ORDER — OXYTOCIN 10 [USP'U]/ML
10 INJECTION, SOLUTION INTRAMUSCULAR; INTRAVENOUS
Status: DISCONTINUED | OUTPATIENT
Start: 2024-05-29 | End: 2024-06-01 | Stop reason: HOSPADM

## 2024-05-29 RX ORDER — CITRIC ACID/SODIUM CITRATE 334-500MG
30 SOLUTION, ORAL ORAL ONCE
Status: COMPLETED | OUTPATIENT
Start: 2024-05-29 | End: 2024-05-29

## 2024-05-29 RX ORDER — OXYCODONE HYDROCHLORIDE 10 MG/1
10 TABLET ORAL EVERY 4 HOURS PRN
Status: ACTIVE | OUTPATIENT
Start: 2024-05-29 | End: 2024-05-30

## 2024-05-29 RX ORDER — METHYLERGONOVINE MALEATE 0.2 MG/ML
0.2 INJECTION INTRAVENOUS
Status: DISCONTINUED | OUTPATIENT
Start: 2024-05-29 | End: 2024-05-29

## 2024-05-29 RX ORDER — DEXTROSE, SODIUM CHLORIDE, SODIUM LACTATE, POTASSIUM CHLORIDE, AND CALCIUM CHLORIDE 5; .6; .31; .03; .02 G/100ML; G/100ML; G/100ML; G/100ML; G/100ML
INJECTION, SOLUTION INTRAVENOUS CONTINUOUS
Status: DISCONTINUED | OUTPATIENT
Start: 2024-05-29 | End: 2024-06-01 | Stop reason: HOSPADM

## 2024-05-29 RX ORDER — OXYCODONE HYDROCHLORIDE 5 MG/1
5 TABLET ORAL EVERY 4 HOURS PRN
Status: ACTIVE | OUTPATIENT
Start: 2024-05-29 | End: 2024-05-30

## 2024-05-29 RX ORDER — SODIUM CHLORIDE, SODIUM LACTATE, POTASSIUM CHLORIDE, CALCIUM CHLORIDE 600; 310; 30; 20 MG/100ML; MG/100ML; MG/100ML; MG/100ML
INJECTION, SOLUTION INTRAVENOUS ONCE
Status: DISCONTINUED | OUTPATIENT
Start: 2024-05-29 | End: 2024-05-29

## 2024-05-29 RX ORDER — ONDANSETRON 2 MG/ML
4 INJECTION INTRAMUSCULAR; INTRAVENOUS EVERY 6 HOURS PRN
Status: ACTIVE | OUTPATIENT
Start: 2024-05-29 | End: 2024-05-30

## 2024-05-29 RX ORDER — VITAMIN A ACETATE, BETA CAROTENE, ASCORBIC ACID, CHOLECALCIFEROL, .ALPHA.-TOCOPHEROL ACETATE, DL-, THIAMINE MONONITRATE, RIBOFLAVIN, NIACINAMIDE, PYRIDOXINE HYDROCHLORIDE, FOLIC ACID, CYANOCOBALAMIN, CALCIUM CARBONATE, FERROUS FUMARATE, ZINC OXIDE, CUPRIC OXIDE 3080; 12; 120; 400; 1; 1.84; 3; 20; 22; 920; 25; 200; 27; 10; 2 [IU]/1; UG/1; MG/1; [IU]/1; MG/1; MG/1; MG/1; MG/1; MG/1; [IU]/1; MG/1; MG/1; MG/1; MG/1; MG/1
1 TABLET, FILM COATED ORAL
Status: DISCONTINUED | OUTPATIENT
Start: 2024-05-30 | End: 2024-06-01 | Stop reason: HOSPADM

## 2024-05-29 RX ORDER — DIPHENHYDRAMINE HYDROCHLORIDE 50 MG/ML
25 INJECTION INTRAMUSCULAR; INTRAVENOUS EVERY 6 HOURS PRN
Status: DISCONTINUED | OUTPATIENT
Start: 2024-05-30 | End: 2024-06-01 | Stop reason: HOSPADM

## 2024-05-29 RX ORDER — OXYTOCIN 10 [USP'U]/ML
INJECTION, SOLUTION INTRAMUSCULAR; INTRAVENOUS PRN
Status: DISCONTINUED | OUTPATIENT
Start: 2024-05-29 | End: 2024-05-29 | Stop reason: SURG

## 2024-05-29 RX ORDER — ACETAMINOPHEN 500 MG
1000 TABLET ORAL EVERY 6 HOURS
Status: COMPLETED | OUTPATIENT
Start: 2024-05-29 | End: 2024-05-30

## 2024-05-29 RX ORDER — CALCIUM CARBONATE 500 MG/1
1000 TABLET, CHEWABLE ORAL EVERY 6 HOURS PRN
Status: DISCONTINUED | OUTPATIENT
Start: 2024-05-29 | End: 2024-06-01 | Stop reason: HOSPADM

## 2024-05-29 RX ORDER — METOCLOPRAMIDE HYDROCHLORIDE 5 MG/ML
10 INJECTION INTRAMUSCULAR; INTRAVENOUS ONCE
Status: COMPLETED | OUTPATIENT
Start: 2024-05-29 | End: 2024-05-29

## 2024-05-29 RX ORDER — KETOROLAC TROMETHAMINE 15 MG/ML
INJECTION, SOLUTION INTRAMUSCULAR; INTRAVENOUS PRN
Status: DISCONTINUED | OUTPATIENT
Start: 2024-05-29 | End: 2024-05-29 | Stop reason: SURG

## 2024-05-29 RX ORDER — OXYCODONE HYDROCHLORIDE 10 MG/1
10 TABLET ORAL EVERY 4 HOURS PRN
Status: DISCONTINUED | OUTPATIENT
Start: 2024-05-30 | End: 2024-06-01 | Stop reason: HOSPADM

## 2024-05-29 RX ORDER — ONDANSETRON 2 MG/ML
INJECTION INTRAMUSCULAR; INTRAVENOUS PRN
Status: DISCONTINUED | OUTPATIENT
Start: 2024-05-29 | End: 2024-05-29 | Stop reason: SURG

## 2024-05-29 RX ORDER — DIPHENHYDRAMINE HYDROCHLORIDE 50 MG/ML
12.5 INJECTION INTRAMUSCULAR; INTRAVENOUS
Status: DISCONTINUED | OUTPATIENT
Start: 2024-05-29 | End: 2024-05-29 | Stop reason: HOSPADM

## 2024-05-29 RX ORDER — BUPIVACAINE HYDROCHLORIDE 2.5 MG/ML
INJECTION, SOLUTION EPIDURAL; INFILTRATION; INTRACAUDAL
Status: COMPLETED | OUTPATIENT
Start: 2024-05-29 | End: 2024-05-29

## 2024-05-29 RX ORDER — ONDANSETRON 2 MG/ML
4 INJECTION INTRAMUSCULAR; INTRAVENOUS EVERY 6 HOURS PRN
Status: DISCONTINUED | OUTPATIENT
Start: 2024-05-30 | End: 2024-06-01 | Stop reason: HOSPADM

## 2024-05-29 RX ORDER — ONDANSETRON 2 MG/ML
4 INJECTION INTRAMUSCULAR; INTRAVENOUS
Status: DISCONTINUED | OUTPATIENT
Start: 2024-05-29 | End: 2024-05-29 | Stop reason: HOSPADM

## 2024-05-29 RX ORDER — EPHEDRINE SULFATE 50 MG/ML
5 INJECTION, SOLUTION INTRAVENOUS
Status: DISCONTINUED | OUTPATIENT
Start: 2024-05-29 | End: 2024-05-29 | Stop reason: HOSPADM

## 2024-05-29 RX ORDER — SODIUM CHLORIDE, SODIUM LACTATE, POTASSIUM CHLORIDE, CALCIUM CHLORIDE 600; 310; 30; 20 MG/100ML; MG/100ML; MG/100ML; MG/100ML
INJECTION, SOLUTION INTRAVENOUS PRN
Status: DISCONTINUED | OUTPATIENT
Start: 2024-05-29 | End: 2024-06-01 | Stop reason: HOSPADM

## 2024-05-29 RX ORDER — DEXAMETHASONE SODIUM PHOSPHATE 4 MG/ML
INJECTION, SOLUTION INTRA-ARTICULAR; INTRALESIONAL; INTRAMUSCULAR; INTRAVENOUS; SOFT TISSUE PRN
Status: DISCONTINUED | OUTPATIENT
Start: 2024-05-29 | End: 2024-05-29 | Stop reason: SURG

## 2024-05-29 RX ORDER — SODIUM CHLORIDE, SODIUM LACTATE, POTASSIUM CHLORIDE, AND CALCIUM CHLORIDE .6; .31; .03; .02 G/100ML; G/100ML; G/100ML; G/100ML
1000 INJECTION, SOLUTION INTRAVENOUS
Status: COMPLETED | OUTPATIENT
Start: 2024-05-29 | End: 2024-05-29

## 2024-05-29 RX ORDER — ACETAMINOPHEN 500 MG
1000 TABLET ORAL EVERY 6 HOURS
Status: DISCONTINUED | OUTPATIENT
Start: 2024-05-30 | End: 2024-06-01 | Stop reason: HOSPADM

## 2024-05-29 RX ORDER — LIDOCAINE HYDROCHLORIDE AND EPINEPHRINE 15; 5 MG/ML; UG/ML
INJECTION, SOLUTION EPIDURAL
Status: COMPLETED | OUTPATIENT
Start: 2024-05-29 | End: 2024-05-29

## 2024-05-29 RX ORDER — ROPIVACAINE HYDROCHLORIDE 2 MG/ML
INJECTION, SOLUTION EPIDURAL; INFILTRATION; PERINEURAL CONTINUOUS
Status: DISCONTINUED | OUTPATIENT
Start: 2024-05-29 | End: 2024-06-01 | Stop reason: HOSPADM

## 2024-05-29 RX ORDER — IBUPROFEN 800 MG/1
800 TABLET ORAL EVERY 8 HOURS
Status: DISCONTINUED | OUTPATIENT
Start: 2024-05-30 | End: 2024-06-01 | Stop reason: HOSPADM

## 2024-05-29 RX ORDER — METHYLERGONOVINE MALEATE 0.2 MG/ML
0.2 INJECTION INTRAVENOUS
Status: DISCONTINUED | OUTPATIENT
Start: 2024-05-29 | End: 2024-06-01 | Stop reason: HOSPADM

## 2024-05-29 RX ORDER — IBUPROFEN 800 MG/1
800 TABLET ORAL EVERY 8 HOURS PRN
Status: DISCONTINUED | OUTPATIENT
Start: 2024-06-02 | End: 2024-06-01 | Stop reason: HOSPADM

## 2024-05-29 RX ORDER — HYDROMORPHONE HYDROCHLORIDE 1 MG/ML
0.1 INJECTION, SOLUTION INTRAMUSCULAR; INTRAVENOUS; SUBCUTANEOUS
Status: DISCONTINUED | OUTPATIENT
Start: 2024-05-29 | End: 2024-05-29 | Stop reason: HOSPADM

## 2024-05-29 RX ORDER — ACETAMINOPHEN 500 MG
1000 TABLET ORAL EVERY 6 HOURS PRN
Status: DISCONTINUED | OUTPATIENT
Start: 2024-06-03 | End: 2024-06-01 | Stop reason: HOSPADM

## 2024-05-29 RX ORDER — OXYCODONE HYDROCHLORIDE 5 MG/1
5 TABLET ORAL EVERY 4 HOURS PRN
Status: DISCONTINUED | OUTPATIENT
Start: 2024-05-30 | End: 2024-06-01 | Stop reason: HOSPADM

## 2024-05-29 RX ORDER — HYDROMORPHONE HYDROCHLORIDE 1 MG/ML
0.4 INJECTION, SOLUTION INTRAMUSCULAR; INTRAVENOUS; SUBCUTANEOUS
Status: ACTIVE | OUTPATIENT
Start: 2024-05-29 | End: 2024-05-30

## 2024-05-29 RX ORDER — LABETALOL HYDROCHLORIDE 5 MG/ML
5 INJECTION, SOLUTION INTRAVENOUS
Status: DISCONTINUED | OUTPATIENT
Start: 2024-05-29 | End: 2024-05-29 | Stop reason: HOSPADM

## 2024-05-29 RX ORDER — SODIUM CHLORIDE, SODIUM GLUCONATE, SODIUM ACETATE, POTASSIUM CHLORIDE AND MAGNESIUM CHLORIDE 526; 502; 368; 37; 30 MG/100ML; MG/100ML; MG/100ML; MG/100ML; MG/100ML
INJECTION, SOLUTION INTRAVENOUS
Status: DISCONTINUED | OUTPATIENT
Start: 2024-05-29 | End: 2024-05-29 | Stop reason: SURG

## 2024-05-29 RX ORDER — SIMETHICONE 125 MG
125 TABLET,CHEWABLE ORAL 4 TIMES DAILY PRN
Status: DISCONTINUED | OUTPATIENT
Start: 2024-05-29 | End: 2024-06-01 | Stop reason: HOSPADM

## 2024-05-29 RX ORDER — CEFAZOLIN SODIUM 1 G/3ML
2 INJECTION, POWDER, FOR SOLUTION INTRAMUSCULAR; INTRAVENOUS ONCE
Status: COMPLETED | OUTPATIENT
Start: 2024-05-29 | End: 2024-05-29

## 2024-05-29 RX ORDER — DIPHENHYDRAMINE HCL 25 MG
25 TABLET ORAL EVERY 6 HOURS PRN
Status: DISCONTINUED | OUTPATIENT
Start: 2024-05-30 | End: 2024-06-01 | Stop reason: HOSPADM

## 2024-05-29 RX ORDER — BISACODYL 10 MG
10 SUPPOSITORY, RECTAL RECTAL PRN
Status: DISCONTINUED | OUTPATIENT
Start: 2024-05-29 | End: 2024-06-01 | Stop reason: HOSPADM

## 2024-05-29 RX ORDER — OXYCODONE HCL 5 MG/5 ML
10 SOLUTION, ORAL ORAL
Status: DISCONTINUED | OUTPATIENT
Start: 2024-05-29 | End: 2024-05-29 | Stop reason: HOSPADM

## 2024-05-29 RX ORDER — MISOPROSTOL 200 UG/1
800 TABLET ORAL
Status: DISCONTINUED | OUTPATIENT
Start: 2024-05-29 | End: 2024-05-29

## 2024-05-29 RX ORDER — EPHEDRINE SULFATE 50 MG/ML
10 INJECTION, SOLUTION INTRAVENOUS
Status: ACTIVE | OUTPATIENT
Start: 2024-05-29 | End: 2024-05-30

## 2024-05-29 RX ORDER — MEPERIDINE HYDROCHLORIDE 25 MG/ML
12.5 INJECTION INTRAMUSCULAR; INTRAVENOUS; SUBCUTANEOUS
Status: DISCONTINUED | OUTPATIENT
Start: 2024-05-29 | End: 2024-05-29 | Stop reason: HOSPADM

## 2024-05-29 RX ORDER — SODIUM CHLORIDE, SODIUM LACTATE, POTASSIUM CHLORIDE, AND CALCIUM CHLORIDE .6; .31; .03; .02 G/100ML; G/100ML; G/100ML; G/100ML
250 INJECTION, SOLUTION INTRAVENOUS PRN
Status: DISCONTINUED | OUTPATIENT
Start: 2024-05-29 | End: 2024-05-29 | Stop reason: HOSPADM

## 2024-05-29 RX ORDER — HYDROMORPHONE HYDROCHLORIDE 1 MG/ML
0.2 INJECTION, SOLUTION INTRAMUSCULAR; INTRAVENOUS; SUBCUTANEOUS
Status: DISCONTINUED | OUTPATIENT
Start: 2024-05-29 | End: 2024-05-29 | Stop reason: HOSPADM

## 2024-05-29 RX ORDER — KETOROLAC TROMETHAMINE 15 MG/ML
15 INJECTION, SOLUTION INTRAMUSCULAR; INTRAVENOUS EVERY 6 HOURS
Status: COMPLETED | OUTPATIENT
Start: 2024-05-30 | End: 2024-05-30

## 2024-05-29 RX ORDER — HYDROMORPHONE HYDROCHLORIDE 1 MG/ML
0.4 INJECTION, SOLUTION INTRAMUSCULAR; INTRAVENOUS; SUBCUTANEOUS
Status: DISCONTINUED | OUTPATIENT
Start: 2024-05-29 | End: 2024-05-29 | Stop reason: HOSPADM

## 2024-05-29 RX ORDER — ONDANSETRON 4 MG/1
4 TABLET, ORALLY DISINTEGRATING ORAL EVERY 6 HOURS PRN
Status: DISCONTINUED | OUTPATIENT
Start: 2024-05-30 | End: 2024-06-01 | Stop reason: HOSPADM

## 2024-05-29 RX ORDER — AZITHROMYCIN 500 MG/5ML
500 INJECTION, POWDER, LYOPHILIZED, FOR SOLUTION INTRAVENOUS ONCE
Status: COMPLETED | OUTPATIENT
Start: 2024-05-29 | End: 2024-05-29

## 2024-05-29 RX ORDER — LIDOCAINE HCL/EPINEPHRINE/PF 2%-1:200K
VIAL (ML) INJECTION PRN
Status: DISCONTINUED | OUTPATIENT
Start: 2024-05-29 | End: 2024-05-29 | Stop reason: SURG

## 2024-05-29 RX ORDER — SODIUM CHLORIDE, SODIUM GLUCONATE, SODIUM ACETATE, POTASSIUM CHLORIDE AND MAGNESIUM CHLORIDE 526; 502; 368; 37; 30 MG/100ML; MG/100ML; MG/100ML; MG/100ML; MG/100ML
1000 INJECTION, SOLUTION INTRAVENOUS ONCE
Status: DISCONTINUED | OUTPATIENT
Start: 2024-05-29 | End: 2024-05-29 | Stop reason: HOSPADM

## 2024-05-29 RX ORDER — OXYCODONE HCL 5 MG/5 ML
5 SOLUTION, ORAL ORAL
Status: DISCONTINUED | OUTPATIENT
Start: 2024-05-29 | End: 2024-05-29 | Stop reason: HOSPADM

## 2024-05-29 RX ORDER — HALOPERIDOL 5 MG/ML
1 INJECTION INTRAMUSCULAR
Status: DISCONTINUED | OUTPATIENT
Start: 2024-05-29 | End: 2024-05-29 | Stop reason: HOSPADM

## 2024-05-29 RX ORDER — DIPHENHYDRAMINE HYDROCHLORIDE 50 MG/ML
12.5 INJECTION INTRAMUSCULAR; INTRAVENOUS EVERY 6 HOURS PRN
Status: ACTIVE | OUTPATIENT
Start: 2024-05-29 | End: 2024-05-30

## 2024-05-29 RX ORDER — MORPHINE SULFATE 0.5 MG/ML
INJECTION, SOLUTION EPIDURAL; INTRATHECAL; INTRAVENOUS PRN
Status: DISCONTINUED | OUTPATIENT
Start: 2024-05-29 | End: 2024-05-29 | Stop reason: SURG

## 2024-05-29 RX ORDER — SODIUM CHLORIDE, SODIUM LACTATE, POTASSIUM CHLORIDE, CALCIUM CHLORIDE 600; 310; 30; 20 MG/100ML; MG/100ML; MG/100ML; MG/100ML
INJECTION, SOLUTION INTRAVENOUS ONCE
Status: ACTIVE | OUTPATIENT
Start: 2024-05-29 | End: 2024-05-30

## 2024-05-29 RX ORDER — HYDRALAZINE HYDROCHLORIDE 20 MG/ML
5 INJECTION INTRAMUSCULAR; INTRAVENOUS
Status: DISCONTINUED | OUTPATIENT
Start: 2024-05-29 | End: 2024-05-29 | Stop reason: HOSPADM

## 2024-05-29 RX ORDER — MISOPROSTOL 200 UG/1
800 TABLET ORAL
Status: DISCONTINUED | OUTPATIENT
Start: 2024-05-29 | End: 2024-06-01 | Stop reason: HOSPADM

## 2024-05-29 RX ORDER — DOCUSATE SODIUM 100 MG/1
100 CAPSULE, LIQUID FILLED ORAL 2 TIMES DAILY PRN
Status: DISCONTINUED | OUTPATIENT
Start: 2024-05-29 | End: 2024-06-01 | Stop reason: HOSPADM

## 2024-05-29 RX ORDER — DIPHENHYDRAMINE HYDROCHLORIDE 50 MG/ML
25 INJECTION INTRAMUSCULAR; INTRAVENOUS EVERY 6 HOURS PRN
Status: ACTIVE | OUTPATIENT
Start: 2024-05-29 | End: 2024-05-30

## 2024-05-29 RX ADMIN — FENTANYL CITRATE 100 MCG: 50 INJECTION, SOLUTION INTRAMUSCULAR; INTRAVENOUS at 12:45

## 2024-05-29 RX ADMIN — LIDOCAINE HYDROCHLORIDE AND EPINEPHRINE 10 ML: 20; 5 INJECTION, SOLUTION EPIDURAL; INFILTRATION; INTRACAUDAL; PERINEURAL at 17:28

## 2024-05-29 RX ADMIN — WATER 2.5 MILLION UNITS: 1 INJECTION INTRAMUSCULAR; INTRAVENOUS; SUBCUTANEOUS at 11:00

## 2024-05-29 RX ADMIN — SODIUM CHLORIDE, SODIUM GLUCONATE, SODIUM ACETATE, POTASSIUM CHLORIDE AND MAGNESIUM CHLORIDE: 526; 502; 368; 37; 30 INJECTION, SOLUTION INTRAVENOUS at 12:45

## 2024-05-29 RX ADMIN — OXYTOCIN 125 ML/HR: 10 INJECTION INTRAVENOUS at 19:10

## 2024-05-29 RX ADMIN — WATER 2.5 MILLION UNITS: 1 INJECTION INTRAMUSCULAR; INTRAVENOUS; SUBCUTANEOUS at 03:17

## 2024-05-29 RX ADMIN — OXYTOCIN 20 UNITS: 10 INJECTION, SOLUTION INTRAMUSCULAR; INTRAVENOUS at 17:52

## 2024-05-29 RX ADMIN — FENTANYL CITRATE 100 MCG: 50 INJECTION, SOLUTION INTRAMUSCULAR; INTRAVENOUS at 04:14

## 2024-05-29 RX ADMIN — FAMOTIDINE 20 MG: 10 INJECTION, SOLUTION INTRAVENOUS at 17:10

## 2024-05-29 RX ADMIN — FENTANYL CITRATE 100 MCG: 50 INJECTION, SOLUTION INTRAMUSCULAR; INTRAVENOUS at 06:56

## 2024-05-29 RX ADMIN — Medication 30 ML: at 17:23

## 2024-05-29 RX ADMIN — OXYTOCIN 14 MILLI-UNITS/MIN: 10 INJECTION INTRAVENOUS at 06:32

## 2024-05-29 RX ADMIN — FENTANYL CITRATE 100 MCG: 50 INJECTION, SOLUTION INTRAMUSCULAR; INTRAVENOUS at 12:51

## 2024-05-29 RX ADMIN — KETOROLAC TROMETHAMINE 15 MG: 15 INJECTION, SOLUTION INTRAMUSCULAR; INTRAVENOUS at 23:56

## 2024-05-29 RX ADMIN — WATER 2.5 MILLION UNITS: 1 INJECTION INTRAMUSCULAR; INTRAVENOUS; SUBCUTANEOUS at 07:07

## 2024-05-29 RX ADMIN — ONDANSETRON 4 MG: 2 INJECTION INTRAMUSCULAR; INTRAVENOUS at 17:34

## 2024-05-29 RX ADMIN — METOCLOPRAMIDE 10 MG: 5 INJECTION, SOLUTION INTRAMUSCULAR; INTRAVENOUS at 17:10

## 2024-05-29 RX ADMIN — CEFAZOLIN 2 G: 1 INJECTION, POWDER, FOR SOLUTION INTRAMUSCULAR; INTRAVENOUS at 17:34

## 2024-05-29 RX ADMIN — ROPIVACAINE HYDROCHLORIDE: 2 INJECTION, SOLUTION EPIDURAL; INFILTRATION at 13:13

## 2024-05-29 RX ADMIN — SODIUM CHLORIDE, SODIUM LACTATE, POTASSIUM CHLORIDE, CALCIUM CHLORIDE AND DEXTROSE MONOHYDRATE: 5; 600; 310; 30; 20 INJECTION, SOLUTION INTRAVENOUS at 15:55

## 2024-05-29 RX ADMIN — BUPIVACAINE HYDROCHLORIDE 4 ML: 2.5 INJECTION, SOLUTION EPIDURAL; INFILTRATION; INTRACAUDAL at 12:51

## 2024-05-29 RX ADMIN — ACETAMINOPHEN 1000 MG: 500 TABLET, FILM COATED ORAL at 20:55

## 2024-05-29 RX ADMIN — MORPHINE SULFATE 1.5 MG: 0.5 INJECTION, SOLUTION EPIDURAL; INTRATHECAL; INTRAVENOUS at 17:55

## 2024-05-29 RX ADMIN — LIDOCAINE HYDROCHLORIDE AND EPINEPHRINE 5 ML: 20; 5 INJECTION, SOLUTION EPIDURAL; INFILTRATION; INTRACAUDAL; PERINEURAL at 17:33

## 2024-05-29 RX ADMIN — AZITHROMYCIN 500 MG: 500 INJECTION, POWDER, LYOPHILIZED, FOR SOLUTION INTRAVENOUS at 17:13

## 2024-05-29 RX ADMIN — SODIUM CHLORIDE, POTASSIUM CHLORIDE, SODIUM LACTATE AND CALCIUM CHLORIDE 1000 ML: 600; 310; 30; 20 INJECTION, SOLUTION INTRAVENOUS at 12:15

## 2024-05-29 RX ADMIN — LIDOCAINE HYDROCHLORIDE,EPINEPHRINE BITARTRATE 5 ML: 15; .005 INJECTION, SOLUTION EPIDURAL; INFILTRATION; INTRACAUDAL; PERINEURAL at 12:51

## 2024-05-29 RX ADMIN — DEXAMETHASONE SODIUM PHOSPHATE 8 MG: 4 INJECTION INTRA-ARTICULAR; INTRALESIONAL; INTRAMUSCULAR; INTRAVENOUS; SOFT TISSUE at 17:34

## 2024-05-29 RX ADMIN — KETOROLAC TROMETHAMINE 15 MG: 15 INJECTION, SOLUTION INTRAMUSCULAR; INTRAVENOUS at 18:19

## 2024-05-29 RX ADMIN — WATER 2.5 MILLION UNITS: 1 INJECTION INTRAMUSCULAR; INTRAVENOUS; SUBCUTANEOUS at 15:11

## 2024-05-29 ASSESSMENT — PAIN DESCRIPTION - PAIN TYPE
TYPE: ACUTE PAIN
TYPE: SURGICAL PAIN
TYPE: ACUTE PAIN
TYPE: SURGICAL PAIN

## 2024-05-29 ASSESSMENT — PAIN SCALES - GENERAL: PAIN_LEVEL: 3

## 2024-05-29 NOTE — PROGRESS NOTES
1900: BS report received from LLOYD Saucedo.    1920: SVE 2/90/-2, Grace CNM updated    2245: SVE 3/100/-2 per Grace, CNM    0245: SVE 3/100/-1 per Grace, ZEYAD    0330: IUPC placed by Grace WAGONER CNM    0700: SVE 4/100/-1 per ZEYAD Edwards. BS report given to LLOYD Rangel. Care relinquished

## 2024-05-29 NOTE — PROGRESS NOTES
This patient is a 23-year-old female G1, P0 at 38-1/7 weeks admitted with spontaneous rupture of membranes at 0200 hrs. on  patient has gone through cervical ripening followed by Pitocin but no further cervical change since this morning despite adequate labor.  Infant has category 1-2 fetal heart tracing throughout.    Recommend primary  section.  Discussed preoperative counseling-risks benefits alternatives of  section were discussed.  Risk of pain bleeding infection damage to any or all internal organs including but not limited to bowel/bladder uterus tubes ovaries nerves blood vessel skin and baby possible wound hematoma seroma or infection possible uterine infection.  We noted that due to spontaneous rupture membranes greater than 24 hours she has a very high chance of infection in the uterus.  Possible blood transfusion with inherent risk of AIDS hepatitis has been discussed and the patient has consented to blood transfusion as needed.  All questions were answered in detail    Operating room staff and nursing staff notified at 1640 hrs. I would expect to begin surgery within 30 minutes from this time

## 2024-05-29 NOTE — PROGRESS NOTES
Pt resting comfortable with epidural, denies pressure or pain. D/w pt plan to check cervix and pt agrees.     Cervix: 4/100/-2, mec fluid noted, vtx  NST: 130bpm, +accels, mod variability, no decels  IUPC: UCs q 2-4 min,  over past 10 min  Pit: 16mU/min    A/P: IUP at 38w1d - will incr pit to 20mU/min, if no change over next 2-4 hours and adequate MVUs, will consider C/S. Pt aware.

## 2024-05-29 NOTE — ANESTHESIA PROCEDURE NOTES
Epidural Block    Date/Time: 5/29/2024 12:51 PM    Performed by: Konrad Granda M.D.  Authorized by: Konrad Granda M.D.    Patient Location:  OB  Start Time:  5/29/2024 12:51 PM  Reason for Block: labor analgesia    patient identified, IV checked, site marked, risks and benefits discussed, surgical consent, monitors and equipment checked and pre-op evaluation    Patient Position:  Sitting  Prep: ChloraPrep, patient draped and sterile technique    Monitoring:  Blood pressure, continuous pulse oximetry and heart rate  Approach:  Midline  Location:  L3-L4  Injection Technique:  LJ saline and LJ air  Skin infiltration:  Lidocaine  Strength:  1%  Dose:  3ml  Needle Type:  Tuohy  Needle Gauge:  17 G  Needle Length:  3.5 in  Loss of resistance::  7.5  Catheter Size:  19 G  Catheter at Skin Depth:  13  Test Dose Result:  Negative   Single pass, easy LJ with negative aspiration. DPE technique 25g spinal needle inserted through Tuohy to verify midline placement, +CSF. Needle removed and catheter threaded. Negative test dose and aspiration via catheter. Patient comfortable after bolus, no complications.

## 2024-05-29 NOTE — ANESTHESIA PREPROCEDURE EVALUATION
Date: 05/29/24  Procedure: Labor Epidural       Patient requests Neuraxial Labor Analgesia.     Anesthesia: No problems with Anesthesia.  Resp: No asthma or COPD history.  Cards: No pre-eclampsia, or known cardiac abnormalities.  Heme: No known bleeding disorders, platelets reviewed.     Risks of procedure discussed including: infection, bleeding, nerve damage, and post-dural puncture headache.     Relevant Problems   No relevant active problems       Physical Exam    Airway   Mallampati: II  TM distance: >3 FB  Neck ROM: full       Cardiovascular - normal exam  Rhythm: regular  Rate: normal  (-) murmur     Dental - normal exam           Pulmonary - normal exam  Breath sounds clear to auscultation     Abdominal    Neurological - normal exam                   Anesthesia Plan    ASA 2       Plan - epidural   Neuraxial block will be labor analgesia                  Pertinent diagnostic labs and testing reviewed    Informed Consent:    Anesthetic plan and risks discussed with patient.

## 2024-05-29 NOTE — PROGRESS NOTES
0700: Report received from LLOYD Martin. POC discussed, questions answered. Care assumed at this time with pt in stable condition.     1237: Pt requested an epidural, anesthesia called at this time.    1244: Anesthesia bedside for epidural placement,test dose negative.    1640: C/S called at this time.Pt consented to procedure. Pt educated and does not have questions at this time.     1900: Report given to LLOYD Savage. PP POC discussed, questions answered. Care relinquished at this time with pt in stable condition.

## 2024-05-29 NOTE — CARE PLAN
The patient is Stable - Low risk of patient condition declining or worsening    Shift Goals  Clinical Goals: Safe progression of labor  Patient Goals: Healthy mom, healthy baby  Family Goals: Support    Progress made toward(s) clinical / shift goals:      Problem: Knowledge Deficit - L&D  Goal: Patient and family/caregivers will demonstrate understanding of plan of care, disease process/condition, diagnostic tests and medications  Outcome: Progressing  Note: Patient updated on plan of care. All questions answered. Patient verbalized understanding. No further questions at this time.       Problem: Pain  Goal: Patient's pain will be alleviated or reduced to the patient’s comfort goal  Outcome: Progressing  Note: Pain rating scale and pain goals discussed with patient. PT declines pain at this time       Patient is not progressing towards the following goals:

## 2024-05-29 NOTE — PROGRESS NOTES
S: In to introduce self to pt and SO. Pt reports feeling CTX, planning on epidural if pain becomes intolerable. She reports H/A.    O:    Vitals:    05/28/24 1225 05/28/24 1234 05/28/24 1630 05/28/24 1637   BP: 129/67 129/67  128/66   Pulse: (!) 103 (!) 103 95    Resp:  16 16    Temp:  36.3 °C (97.3 °F)  36.2 °C (97.2 °F)   TempSrc:  Temporal  Temporal   SpO2: 95% 95% 95%    Weight:       Height:               FHTs:  Baseline 140,  positive accels, negative decels, moderate variability        [employer]: Contractions q 2-3 minutes, mild to palpation, pitocin @ 6 milliunits        SVE: 2/90/-2     A/P:  1.  IUP @ 38w0d            2.  Cat 1 FHTs             3.  Continue with pitocin titration per protocol             4.  Will reassess in 3 hours or as indicated           5.  Pt may have Tylenol and ice pack for H/A    Grace Flores CNM, APRN

## 2024-05-29 NOTE — CARE PLAN
The patient is Stable - Low risk of patient condition declining or worsening    Shift Goals  Clinical Goals: cervical change  Patient Goals: healthy mom healthy baby  Family Goals: labor support    Progress made toward(s) clinical / shift goals:    Problem: Pain  Goal: Patient's pain will be alleviated or reduced to the patient’s comfort goal  Outcome: Progressing  Flowsheets (Taken 5/28/2024 2131)  OB Pain Level: 1-Coping  Note: The pt's pain level will be alleviated to their pain goal. Pain management interventions will be offered and the pt will be educated on different pain management techniques during labor     Problem: Risk for Infection and Impaired Wound Healing  Goal: Patient will remain free from infection  Outcome: Progressing  Note: The pt will remain free from s/s infection, the pt will remain afebrile        Patient is not progressing towards the following goals: n/a

## 2024-05-30 LAB
ERYTHROCYTE [DISTWIDTH] IN BLOOD BY AUTOMATED COUNT: 44.6 FL (ref 35.9–50)
HCT VFR BLD AUTO: 36.5 % (ref 37–47)
HGB BLD-MCNC: 12.3 G/DL (ref 12–16)
MCH RBC QN AUTO: 31.1 PG (ref 27–33)
MCHC RBC AUTO-ENTMCNC: 33.7 G/DL (ref 32.2–35.5)
MCV RBC AUTO: 92.4 FL (ref 81.4–97.8)
PLATELET # BLD AUTO: 204 K/UL (ref 164–446)
PMV BLD AUTO: 11 FL (ref 9–12.9)
RBC # BLD AUTO: 3.95 M/UL (ref 4.2–5.4)
WBC # BLD AUTO: 20.4 K/UL (ref 4.8–10.8)

## 2024-05-30 PROCEDURE — 770002 HCHG ROOM/CARE - OB PRIVATE (112)

## 2024-05-30 PROCEDURE — A9270 NON-COVERED ITEM OR SERVICE: HCPCS | Performed by: INTERNAL MEDICINE

## 2024-05-30 PROCEDURE — A9270 NON-COVERED ITEM OR SERVICE: HCPCS | Performed by: OBSTETRICS & GYNECOLOGY

## 2024-05-30 PROCEDURE — 85027 COMPLETE CBC AUTOMATED: CPT

## 2024-05-30 PROCEDURE — 36415 COLL VENOUS BLD VENIPUNCTURE: CPT

## 2024-05-30 PROCEDURE — 700102 HCHG RX REV CODE 250 W/ 637 OVERRIDE(OP): Performed by: OBSTETRICS & GYNECOLOGY

## 2024-05-30 PROCEDURE — 700102 HCHG RX REV CODE 250 W/ 637 OVERRIDE(OP): Performed by: INTERNAL MEDICINE

## 2024-05-30 PROCEDURE — 700105 HCHG RX REV CODE 258: Performed by: OBSTETRICS & GYNECOLOGY

## 2024-05-30 PROCEDURE — 700111 HCHG RX REV CODE 636 W/ 250 OVERRIDE (IP): Performed by: INTERNAL MEDICINE

## 2024-05-30 RX ADMIN — ACETAMINOPHEN 1000 MG: 500 TABLET, FILM COATED ORAL at 03:19

## 2024-05-30 RX ADMIN — KETOROLAC TROMETHAMINE 15 MG: 15 INJECTION, SOLUTION INTRAMUSCULAR; INTRAVENOUS at 18:37

## 2024-05-30 RX ADMIN — KETOROLAC TROMETHAMINE 15 MG: 15 INJECTION, SOLUTION INTRAMUSCULAR; INTRAVENOUS at 12:40

## 2024-05-30 RX ADMIN — IBUPROFEN 800 MG: 800 TABLET, FILM COATED ORAL at 22:16

## 2024-05-30 RX ADMIN — KETOROLAC TROMETHAMINE 15 MG: 15 INJECTION, SOLUTION INTRAMUSCULAR; INTRAVENOUS at 05:58

## 2024-05-30 RX ADMIN — SODIUM CHLORIDE, POTASSIUM CHLORIDE, SODIUM LACTATE AND CALCIUM CHLORIDE: 600; 310; 30; 20 INJECTION, SOLUTION INTRAVENOUS at 06:05

## 2024-05-30 RX ADMIN — ACETAMINOPHEN 1000 MG: 500 TABLET, FILM COATED ORAL at 17:14

## 2024-05-30 RX ADMIN — PRENATAL WITH FERROUS FUM AND FOLIC ACID 1 TABLET: 3080; 920; 120; 400; 22; 1.84; 3; 20; 10; 1; 12; 200; 27; 25; 2 TABLET ORAL at 09:48

## 2024-05-30 RX ADMIN — ACETAMINOPHEN 1000 MG: 500 TABLET, FILM COATED ORAL at 22:17

## 2024-05-30 RX ADMIN — ACETAMINOPHEN 1000 MG: 500 TABLET, FILM COATED ORAL at 09:48

## 2024-05-30 ASSESSMENT — PAIN DESCRIPTION - PAIN TYPE
TYPE: SURGICAL PAIN;ACUTE PAIN
TYPE: SURGICAL PAIN
TYPE: SURGICAL PAIN

## 2024-05-30 ASSESSMENT — EDINBURGH POSTNATAL DEPRESSION SCALE (EPDS)
I HAVE BEEN SO UNHAPPY THAT I HAVE BEEN CRYING: NO, NEVER
I HAVE BEEN ABLE TO LAUGH AND SEE THE FUNNY SIDE OF THINGS: AS MUCH AS I ALWAYS COULD
I HAVE BEEN ANXIOUS OR WORRIED FOR NO GOOD REASON: NO, NOT AT ALL
THE THOUGHT OF HARMING MYSELF HAS OCCURRED TO ME: NEVER
THINGS HAVE BEEN GETTING ON TOP OF ME: NO, I HAVE BEEN COPING AS WELL AS EVER
I HAVE FELT SAD OR MISERABLE: NO, NOT AT ALL
I HAVE LOOKED FORWARD WITH ENJOYMENT TO THINGS: AS MUCH AS I EVER DID
I HAVE FELT SCARED OR PANICKY FOR NO GOOD REASON: NO, NOT AT ALL
I HAVE BLAMED MYSELF UNNECESSARILY WHEN THINGS WENT WRONG: NO, NEVER
I HAVE BEEN SO UNHAPPY THAT I HAVE HAD DIFFICULTY SLEEPING: NOT AT ALL

## 2024-05-30 ASSESSMENT — PATIENT HEALTH QUESTIONNAIRE - PHQ9
2. FEELING DOWN, DEPRESSED, IRRITABLE, OR HOPELESS: NOT AT ALL
SUM OF ALL RESPONSES TO PHQ9 QUESTIONS 1 AND 2: 0
1. LITTLE INTEREST OR PLEASURE IN DOING THINGS: NOT AT ALL

## 2024-05-30 NOTE — PROGRESS NOTES
Obstetrics & Gynecology Post-Delivery Progress Note    Date of Service    23 y.o.  s/p  for failure to progress  Delivery date: 24 @ 17:51    Subjective  Pt reports soreness in the area of her surgical incision which has been well-controlled with tylenol and toradol scheduled. Reports tolerating oral intake without nausea and vomiting. Hassan catheter still in place.     Pain: Well controlled  Bleeding: lochia minimal  Tolerating PO: yes  Voiding: hassan in place  Ambulating: yes  Passing flatus: No  Feeding: breastfeeding with difficulty due to latch      Objective  24hr VS:  Temp:  [35.9 °C (96.7 °F)-37 °C (98.6 °F)] 36.8 °C (98.2 °F)  Pulse:  [] 95  Resp:  [17-19] 18  BP: (107-131)/(55-73) 118/61  SpO2:  [92 %-99 %] 95 %    Physical Exam  Gen: well appearing, no apparent distress, resting comfortably in bed  CV: rrr, no m/r/g  Resp: CTAB, symmetric breath sounds  Abd: soft, nontender, nondistended  Fundus: firm, below umbilicus, and tender  Incision: dressing clean, dry, intact  Ext: symmetric, no peripheral edema, calves nontender    Labs:  Recent Labs     24  1450 24  0450   WBC 12.9* 20.4*   RBC 4.58 3.95*   HEMOGLOBIN 14.5 12.3   HEMATOCRIT 41.3 36.5*   MCV 90.2 92.4   MCH 31.7 31.1   RDW 44.4 44.6   PLATELETCT 237 204   MPV 10.7 11.0   NEUTSPOLYS 75.90*  --    LYMPHOCYTES 15.30*  --    MONOCYTES 6.30  --    EOSINOPHILS 1.40  --    BASOPHILS 0.50  --      Medications  acetaminophen, 1,000 mg, Oral, Q6HR  ketorolac, 15 mg, Intravenous, Q6HR  ibuprofen, 800 mg, Oral, Q8HRS  acetaminophen, 1,000 mg, Oral, Q6HRS  LR, , Intravenous, Once  oxytocin, 20 Units, Intravenous, Once  prenatal plus vitamin, 1 Tablet, Oral, Daily-0800      PRN medications: oxytocin, oxyCODONE immediate-release, oxyCODONE immediate release, HYDROmorphone, HYDROmorphone, ePHEDrine, ondansetron, diphenhydrAMINE, diphenhydrAMINE **OR** diphenhydrAMINE **OR** naloxone HCl (Narcan) 20 mg in  mL  infusion, LR, ibuprofen **FOLLOWED BY** [START ON 2024] ibuprofen, acetaminophen **FOLLOWED BY** [START ON 6/3/2024] acetaminophen, oxyCODONE immediate-release, oxyCODONE immediate release, ondansetron **OR** ondansetron, diphenhydrAMINE **OR** diphenhydrAMINE, docusate sodium, tetanus-dipth-acell pertussis, oxytocin, misoprostol, methylergonovine, bisacodyl, magnesium hydroxide, simethicone, calcium carbonate      Assessment/Plan  Elizabeth Delgado is a 23 y.o.yo  postpartum day #1  s/p  for failure to progress    - Post care:  pending removal of Rose catheter and spontaneous void, passing gas.   - Pain: controlled  - Rh+, Rubella Immune  - Method of Feeding: plans to breastfeed  - Method of Contraception:  Wishes to discuss with OB at follow-up visit. Discussed risk of recurrent pregnancy, pelvic rest x6wks, and use of barrier methods.     VTE prophylaxis: SCDs    - Disposition: likely home PPD2-3     Orquidea Garcia M.D.  PGY-1, [facility]

## 2024-05-30 NOTE — OR SURGEON
Immediate Post OP Note    PreOp Diagnosis: 30 1/7 weeks with SROM arrest of dilatation      PostOp Diagnosis: Same      Procedure(s):   SECTION, PRIMARY    Surgeon(s):  José Miguel Mayen M.D.    Anesthesiologist/Type of Anesthesia:  Anesthesiologist: Konrad Granda M.D./Spinal    Surgical Staff:  * No surgical staff found *    Specimens removed if any:  * No specimens in log *    Estimated Blood Loss: 500 cc    Findings: Apgar scores 7 and 9, cephalic presentation clear amniotic fluid normal pelvis     Complications: None        2024 6:25 PM José Miguel Mayen M.D.

## 2024-05-30 NOTE — CARE PLAN
Problem: Altered Physiologic Condition  Goal: Patient physiologically stable as evidenced by normal lochia, palpable uterine involution and vitals within normal limits  Outcome: Progressing  FF@U-1, lochia light, VSS     Problem: Infection - Postpartum  Goal: Postpartum patient will be free of signs and symptoms of infection  Outcome: Progressing  Patient has no signs/symptoms of infection.  Vital signs stable.  Ambulating without difficulty.   The patient is Stable - Low risk of patient condition declining or worsening    Shift Goals  Clinical Goals: maintain VSS, pain control  Patient Goals: rest, pain control  Family Goals: support, update POC    Progress made toward(s) clinical / shift goals:  VSS, pt denies pain this morning but advised to call .PRN Pain, Resting, FOB supportive

## 2024-05-30 NOTE — PROGRESS NOTES
1900 Report received from LLOYD Rangel. POC discussed about PACU recovery, questions answered.     1940 Pt transferred to PP in stable condition via gurney with infant in arms. Report given to LLOYD Nolasco. Infant bands matched x2 cuddles active.

## 2024-05-30 NOTE — ANESTHESIA TIME REPORT
Anesthesia Start and Stop Event Times       Date Time Event    5/29/2024 1245 Ready for Procedure     1245 Anesthesia Start     1830 Anesthesia Stop          Responsible Staff  05/29/24      Name Role Begin End    Konrad Granda M.D. Anesth 1245 1830          Overtime Reason:  no overtime (within assigned shift)    Comments:

## 2024-05-30 NOTE — CARE PLAN
The patient is Stable - Low risk of patient condition declining or worsening    Shift Goals  Clinical Goals: Cervical change  Patient Goals: Delivery  Family Goals: Support and education    Progress made toward(s) clinical / shift goals:  Support and education provided to pt and family as labor progressed to C/S delivery.     Patient is not progressing towards the following goals: N/A    Problem: Pain  Goal: Patient's pain will be alleviated or reduced to the patient’s comfort goal  Outcome: Progressing  Note: Assess for nonverbal signs of ineffective coping with pain and offer pain medication and/or epidural anesthesia.      Problem: Risk for Infection and Impaired Wound Healing  Goal: Patient will remain free from infection  Outcome: Progressing  Note: Utilize Standard Precautions at all times to reduce the risk of transmission of microorganisms from both recognized and unrecognized sources of infection. Limit vaginal exams as necessary.

## 2024-05-30 NOTE — PROGRESS NOTES
Pt. Arrived by sanjuana to postpartum  with belongings to room 312, received report from Janette DESAI. Pt. Doing well. Funds firm, light alphonso/rubra. Pt oriented to room, emergency call light and infant security. Pt. Aware of dangers of sleeping with infant. Call light within reach.

## 2024-05-30 NOTE — OP REPORT
DATE OF SERVICE:  2024     PREOPERATIVE DIAGNOSIS:  Intrauterine pregnancy at 38 and 1/7th weeks with   spontaneous rupture of the membranes and arrest of dilatation.     POSTOPERATIVE DIAGNOSIS:  Intrauterine pregnancy at 38 and 1/7th weeks with   spontaneous rupture of the membranes and arrest of dilatation.     SURGEON:  José Miguel Mayen MD     ASSISTANT:  STORMY Bradley     PROCEDURE:  Primary low transverse uterine  section.     ANESTHESIA:  Epidural.     ANESTHESIOLOGIST:  Konrad Granda MD     ESTIMATED BLOOD LOSS:  500 mL     SPECIMENS:  Cord gas sent to pathology department.     DRAINS:  Rose catheter.     COMPLICATIONS:  None.     INDICATIONS:  This patient is a 23-year-old female  1, para 0 at 38 and   1/7th weeks who presented with spontaneous rupture of the membranes at   approximately 0200 hours on 2024.  She dilated to 4 cm with no further   dilatation despite adequate labor.     FINDINGS:  Apgar score 7 and 9, cephalic presentation, clear amniotic fluid,   normal pelvis.  Cord gas results pending, weight pending.     DESCRIPTION OF PROCEDURE:  After adequately being counseled, the patient was   taken to the operating room and placed in supine position.  The patient was   taken to the operating room.  Epidural anesthesia was dosed.  She was prepped   and draped in the usual sterile fashion.  Surgical timeout was performed.  The   patient had a Pfannenstiel skin incision made 2 cm above the pubic symphysis,   taken down to the fascia with a scalpel.  Fascial incision taken laterally on   both sides with Carvalho scissors.  Rectus fascia dissected off the underlying   rectus muscles both superiorly and inferiorly.  Rectus muscles were split in   the midline.  The peritoneal cavity was entered bluntly with digits and the   peritoneal incision taken superiorly and inferiorly.  Bladder blade placed   over the bladder.  Next, a low transverse uterine incision was made with  a   scalpel.  The infant was delivered, bulb suctioned.  Umbilical cord clamped   and cut and the infant handed to pediatrics.  Placenta was removed from the   uterus and the uterine cavity was cleansed with a moist laparotomy sponge.    Uterus was exteriorized and the uterine incision was closed in a running   locked fashion.  Second layer was an imbricating layer using 0 Vicryl in a   running locking fashion.  Good hemostasis noted.  Uterus returned to the   abdominal pelvic cavity.  Pelvis was irrigated and suctioned.  Peritoneal   lining was closed using running nonlocked stitch of 0 Vicryl and the rectus   muscles were reapproximated in the midline using interrupted stitch of 0   chromic.  The rectus fascia was closed using running nonlocked stitch of 0   Vicryl.  Subcutaneous tissues were irrigated and suctioned.  Electrocautery   was used for hemostasis.  Several subcuticular stitches of 0 Vicryl were used   to reapproximate subcutaneous tissues.  Skin was closed using surgical staples   and a pressure dressing was applied.  The patient was taken to recovery room   in good condition.  No complications noted.        ______________________________  SID MARIA MD    [facility] [place]    DD:  05/29/2024 18:33  DT:  05/29/2024 18:53    Job#:  [id number]

## 2024-05-30 NOTE — ANESTHESIA POSTPROCEDURE EVALUATION
Patient: Elizabeth Delgado    Procedure Summary       Date: 24 Room / Location: [facility] OR 01 / SURGERY LABOR AND DELIVERY    Anesthesia Start: 1245 Anesthesia Stop:     Procedure:  SECTION, PRIMARY (Bilateral: Abdomen) Diagnosis:        delivery delivered      (Primary  Section Delivered)    Surgeons: José Miguel Mayen M.D. Responsible Provider: Konrad Granda M.D.    Anesthesia Type: epidural ASA Status: 2            Final Anesthesia Type: epidural  Last vitals  BP   Blood Pressure: 125/60    Temp   36.4 °C (97.6 °F)    Pulse   73   Resp   16    SpO2   94 %      Anesthesia Post Evaluation    Patient location during evaluation: floor  Patient participation: complete - patient participated  Level of consciousness: awake and alert  Pain score: 3    Airway patency: patent  Anesthetic complications: no  Cardiovascular status: hemodynamically stable  Respiratory status: acceptable  Hydration status: euvolemic  Comments: Patient tolerated epidural labor analgesia well. Epidural will be removed per protocol. RN to call with any questions or concerns.       PONV: none          No notable events documented.     Nurse Pain Score: 3 (NPRS)

## 2024-05-30 NOTE — LACTATION NOTE
"This note was copied from a baby's chart.  Baby 38.1 weeks, , baby 15 hours old, MOB Hx THC (previous smoker). Baby swaddled in crib, began to cue. Mother requested latch assist from , baby placed STS. MOB prefers football position, LC positioned baby at breast, obtained deep latch x 10 min- mother describes latch as \"tug\".  provided demo on hand expression, expressed drop. Parents watched [employer] \"hand expression\" video, spoons provided. Recommended mother hand express 4-5 times during day x 2-3 minutes each breast until milk supply comes in.      Feed baby with feeding cues and at least a minimum of 8x/24 hours.  Expect cluster feeding as this is normal during early days of life and growth spurts.  It is not recommended to let baby sleep longer than 4 hours between feedings and if sleepy, place skin to skin to promote feeding interest and milk production.  Baby's usually feed more frequently and longer when skin to skin with mother. It is not recommended to use pacifiers.     Mother has [facility], mother will follow-up with WIC once discharged to home.     Breastfeeding plan:  Breastfeed on cue a minimum 8 or more times in 24 hours no longer than 3 hours from last feed. Hand express & feed back in addition to breastfeeding until milk supply comes in.  "

## 2024-05-30 NOTE — PROGRESS NOTES
Assessment done.  Care plan reviewed and pt progressing according to caremap.  FOB at bedside and very supportive. Reviewed infant security measures with pt per hospital protocol.  Pt advised of emergency cords in her room.  Pt encouraged to wear supportive bra.  Ambulated and using peribottle to perineum. Pt has hassan to down drain, concentrated urine, encouraged fluids by mouth, pt also receiving LR@ 125ml/hr.   Pain currently a  0 on 0-10 pain scale, scheduled Tylenol given. Pt advised to call for any needs.

## 2024-05-31 ENCOUNTER — PHARMACY VISIT (OUTPATIENT)
Dept: PHARMACY | Facility: MEDICAL CENTER | Age: 24
End: 2024-05-31
Payer: COMMERCIAL

## 2024-05-31 PROBLEM — Z98.891 S/P CESAREAN SECTION: Status: ACTIVE | Noted: 2024-05-31

## 2024-05-31 PROCEDURE — RXMED WILLOW AMBULATORY MEDICATION CHARGE

## 2024-05-31 PROCEDURE — 700102 HCHG RX REV CODE 250 W/ 637 OVERRIDE(OP): Performed by: OBSTETRICS & GYNECOLOGY

## 2024-05-31 PROCEDURE — 770002 HCHG ROOM/CARE - OB PRIVATE (112)

## 2024-05-31 PROCEDURE — A9270 NON-COVERED ITEM OR SERVICE: HCPCS | Performed by: OBSTETRICS & GYNECOLOGY

## 2024-05-31 RX ORDER — OXYCODONE HYDROCHLORIDE 5 MG/1
5 TABLET ORAL EVERY 4 HOURS PRN
Qty: 20 TABLET | Refills: 0 | Status: SHIPPED | OUTPATIENT
Start: 2024-05-31 | End: 2024-06-14

## 2024-05-31 RX ORDER — ACETAMINOPHEN 500 MG
1000 TABLET ORAL EVERY 6 HOURS
Qty: 30 TABLET | Refills: 0 | Status: SHIPPED | OUTPATIENT
Start: 2024-05-31

## 2024-05-31 RX ORDER — IBUPROFEN 800 MG/1
800 TABLET ORAL EVERY 8 HOURS
Qty: 30 TABLET | Refills: 0 | Status: SHIPPED | OUTPATIENT
Start: 2024-05-31

## 2024-05-31 RX ORDER — PSEUDOEPHEDRINE HCL 30 MG
100 TABLET ORAL 2 TIMES DAILY PRN
Qty: 30 CAPSULE | Refills: 0 | Status: SHIPPED | OUTPATIENT
Start: 2024-05-31

## 2024-05-31 RX ADMIN — IBUPROFEN 800 MG: 800 TABLET, FILM COATED ORAL at 05:51

## 2024-05-31 RX ADMIN — PRENATAL WITH FERROUS FUM AND FOLIC ACID 1 TABLET: 3080; 920; 120; 400; 22; 1.84; 3; 20; 10; 1; 12; 200; 27; 25; 2 TABLET ORAL at 07:48

## 2024-05-31 RX ADMIN — IBUPROFEN 800 MG: 800 TABLET, FILM COATED ORAL at 22:19

## 2024-05-31 RX ADMIN — IBUPROFEN 800 MG: 800 TABLET, FILM COATED ORAL at 13:48

## 2024-05-31 RX ADMIN — ACETAMINOPHEN 1000 MG: 500 TABLET, FILM COATED ORAL at 11:56

## 2024-05-31 RX ADMIN — ACETAMINOPHEN 1000 MG: 500 TABLET, FILM COATED ORAL at 23:56

## 2024-05-31 RX ADMIN — ACETAMINOPHEN 1000 MG: 500 TABLET, FILM COATED ORAL at 05:51

## 2024-05-31 RX ADMIN — OXYCODONE HYDROCHLORIDE 10 MG: 10 TABLET ORAL at 07:48

## 2024-05-31 RX ADMIN — OXYCODONE HYDROCHLORIDE 10 MG: 10 TABLET ORAL at 02:53

## 2024-05-31 RX ADMIN — ACETAMINOPHEN 1000 MG: 500 TABLET, FILM COATED ORAL at 17:52

## 2024-05-31 ASSESSMENT — PAIN DESCRIPTION - PAIN TYPE
TYPE: SURGICAL PAIN
TYPE: ACUTE PAIN;SURGICAL PAIN
TYPE: SURGICAL PAIN
TYPE: ACUTE PAIN;SURGICAL PAIN
TYPE: SURGICAL PAIN
TYPE: ACUTE PAIN;SURGICAL PAIN
TYPE: SURGICAL PAIN
TYPE: SURGICAL PAIN

## 2024-05-31 NOTE — PROGRESS NOTES
Report received at bedside. Denies needs at this time  7827 assessment done wdl. Medicated after shower for pain of 9 out of 10 . Poc discussed.

## 2024-05-31 NOTE — PROGRESS NOTES
Advised to remove pressure dressing from incision once saturated after next shower, shower ok after 24 hours.  Pt verbalizes understanding.  Rose removed, hat in toilet, advised to void in hat first time and call staff to chart amount.  IV also discontinued after last Toradol dose.

## 2024-05-31 NOTE — DISCHARGE PLANNING
"Discharge Planning Assessment Post Partum    Reason for Referral: Prev hx of THC use  Address: [facility]  [place] [address]  Type of Living Situation: MOB, FOB, mothers parents.   Mom Diagnosis: Postpartum  Baby Diagnosis:  38w1d.  Primary Language: English    Name of Baby: Sonya Delgado  Father of the Baby: Glenn Delgado  Involved in baby’s care? Yes  Contact Information: [phone]    Prenatal Care: Yes  Mom's PCP: Pura Pitts - [phone]  PCP for new baby:  [facility].     Support System: Yes - MOB reports feeling well supported.  Coping/Bonding between mother & baby: Appropriate.  Source of Feeding: Breast  Supplies for Infant: Yes, denies any need    Mom's Insurance: [employer]  Baby Covered on Insurance: Yes, moms.  Mother Employed/School: Yes, [employer].  Other children in the home/names & ages: None    Financial Hardship/Income: MOB reports none.    Mom's Mental status: Mom states she \"feels good.\"   Services used prior to admit: MOB reports none.    CPS History: None  Psychiatric History: MOB reports none  Domestic Violence History: No  Drug/ETOH History: MOB states she used THC in the past but quit \"the day she found out she was pregnant.\"    Resources Provided: MOB declined need for resources.   Referrals Made: None     Clearance for Discharge: Baby clear to D/C with parents when medically ready.      Ongoing Plan: SW will remain available for any further needs.    "

## 2024-05-31 NOTE — LACTATION NOTE
This note was copied from a baby's chart.  Lactation follow-up visit    Baby's Wt loss 5.07%, couplet to be discharged today. MOB reports baby is latching & breastfeeding well, denies nipple damage or pain with latches. MOB did watch [employer] videos, and reports they were very informative.     MOB plans to follow-up with WIC once home.     Breastfeeding plan:  Breastfeed on cue a minimum 8 or more times in 24 hours no longer than 3 hours from last feed.

## 2024-05-31 NOTE — DISCHARGE INSTRUCTIONS
PATIENT DISCHARGE EDUCATION INSTRUCTION SHEET    REASONS TO CALL YOUR OBSTETRICIAN  Persistent fever, shaking, chills (Temperature higher than 100.4) may indicate you have an infection  Heavy bleeding: soaking more than 1 pad per hour; Passing clots an egg-sized clot or bigger may mean you have an postpartum hemorrhage  Foul odor from vagina or bad smelling or discolored discharge or blood  Breast infection (Mastitis symptoms); breast pain, chills, fever, redness or red streaks, may feel flu like symptoms  Urinary pain, burning or frequency  Incision that is not healing, increased redness, swelling, tenderness or pain, or any pus from episiotomy or  site may mean you have an infection  Redness, swelling, warmth, or painful to touch in the calf area of your leg may mean you have a blood clot  Severe or intensified depression, thoughts or feelings of wanting to hurt yourself or someone else   Pain in chest, obstructed breathing or shortness of breath (trouble catching your breath) may mean you are having a postpartum complication. Call your provider immediately   Headache that does not get better, even after taking medicine, a bad headache with vision changes or pain in the upper right area of your belly may mean you have high blood pressure or post birth preeclampsia. Call your provider immediately    HAND WASHING  All family and friends should wash their hands:  Before and after holding the baby  Before feeding the baby  After using the restroom or changing the baby's diaper    WOUND CARE  Ask your physician for additional care instructions. In general:   Incision:  May shower and pat incision dry   Keep the incision clean and dry  There should not be any opening or pus from the incision  Continue to walk at home 3 times a day   Do NOT lift anything heavier than your baby (over 10 pounds)  Encourage family to help participate in care of the  to allow rest and mom time to heal    VAGINAL CARE  "AND BLEEDING  Nothing inside vagina for 6 weeks:   No sexual intercourse, tampons or douching  Bleeding may continue for 2-4 weeks. Amount and color may vary  Soaking 1 pad or more in an hour for several hours is considered heavy bleeding  Passing large egg sized blood clots can be concerning  If you feel like you have heavy bleeding or are having increasing amount of blood clots call your Obstetrician immediately  If you begin feeling faint upon standing, feeling sick to your stomach, have clammy skin, a really fast heartbeat, have chills, start feeling confused, dizzy, sleepy or weak, or feeling like you're going to faint call your Obstetrician immediately    HYPERTENSION   Preeclampsia or gestational hypertension are types of high blood pressure that only pregnant women can get. It is important for you to be aware of symptoms to seek early intervention and treatment. If you have any of these symptoms immediately call your Obstetrician    Vision changes or blurred vision   Severe headache or pain that is unrelieved with medication and will not go away  Persistent pain in upper abdomen or shoulder   Increased swelling of face, feet, or hands  Difficulty breathing or shortness of breath at rest  Urinating less than usual    URINATION AND BOWEL MOVEMENTS  Eating more fiber (bran cereal, fruits, and vegetables) and drinking plenty of fluids will help to avoid constipation  Urinary frequency and urgency after childbirth is normal  If you experience any urinary pain, burning or frequency call your provider    BIRTH CONTROL  It is possible to become pregnant at any time after delivery and while breastfeeding  Plan to discuss a method of birth control with your physician at your post delivery follow up visit    POSTPARTUM BLUES  During the first few days after birth, you may experience a sense of the \"blues\" which may include impatience, irritability or even crying. These feelings come and go quickly. However, as many as " "1 in 10 women experience emotional symptoms known as postpartum depression.     POSTPARTUM DEPRESSION    May start as early as the second or third day after delivery or take several weeks or months to develop. Symptoms of \"blues\" are present, but are more intense: Crying spells; loss of appetite; feelings of hopelessness or loss of control; fear of touching the baby; over concern or no concern at all about the baby; little or no concern about your own appearance/caring for yourself; and/or inability to sleep or excessive sleeping. Contact your Obstetrician if you are experiencing any of these symptoms     PREVENTING SHAKEN BABY  If you are angry or stressed, PUT THE BABY IN THE CRIB, step away, take some deep breaths, and wait until you are calm to care for the baby. DO NOT SHAKE THE BABY. You are not alone, call a supporter for help.  [facility] 24/7 crisis call line (718-026-3284) or (1-752.853.3551)  You can also text them, text \"ANSWER\" (730001)    "

## 2024-05-31 NOTE — CARE PLAN
The patient is Stable - Low risk of patient condition declining or worsening    Shift Goals  Clinical Goals: lochia light pain control  Patient Goals: rest, pain control, breastfeed  Family Goals: support, update on plan of care    Progress made toward(s) clinical / shift goals:  vss. Taking pain meds stating adequate relief. Lochia scant . Incision clean and dry    Patient is not progressing towards the following goals:

## 2024-05-31 NOTE — DISCHARGE SUMMARY
Discharge Summary:     Date of Admission: 2024  Date of Discharge: 24    Admitting diagnosis:    1. Pregnancy @ 38w1d  2. Spontaneous rupture of membranes   3. GBS positive     Discharge Diagnosis:   1. Status post  for failure to progress.  2. Spontaneous rupture of membranes   3. GBS positive, adequately treated     History reviewed. No pertinent past medical history.  OB History    Para Term  AB Living   1 1 1 0 0 1   SAB IAB Ectopic Molar Multiple Live Births   0 0 0 0 0 1      # Outcome Date GA Lbr Emiliano/2nd Weight Sex Delivery Anes PTL Lv   1 Term 24 38w1d  2.86 kg (6 lb 4.9 oz) F CS-LTranv EPI, Spinal N JULIET      Complications: Failure to Progress in First Stage     Past Surgical History:   Procedure Laterality Date    PRIMARY C SECTION Bilateral 2024    Procedure:  SECTION, PRIMARY;  Surgeon: José Miguel Mayen M.D.;  Location: [facility];  Service: Labor and Delivery     Patient has no known allergies.    Patient Active Problem List   Diagnosis    GBS (group B Streptococcus carrier), +RV culture, currently pregnant    Indication for care in labor or delivery    S/P  section       Hospital Course:   Pt is a 23 y.o. now  who presented on 2024 for SROM, admitted for labor management. Labor augmentation was performed with pitocin and she was started on PCN for GBS positive status; received adequate treatment. Epidural anesthesia was utilized with good effect on pain.  She dilated to 4 cm with no further   dilatation despite adequate labor and therefore underwent PCS on  at 1751 for arrest of dilatation. Apgars 7, 9 at 1 and 5 minutes respectively.  ml.     Postpartum course notable for early ambulation, well managed pain, tolerance of diet, spontaneous voiding, and appropriate feeding of infant. She has remained afebrile and blood pressure has been well controlled. All maternal questions and concerns addressed.      Physical Exam:  Temp:  [36.2 °C (97.2 °F)-36.8 °C (98.3 °F)] 36.2 °C (97.2 °F)  Pulse:  [83-90] 83  Resp:  [18] 18  BP: (107-130)/(67-78) 107/67  SpO2:  [96 %-97 %] 97 %  Physical Exam  General: well appearing, no apparent distress  Abdomen: soft, nontender, nondistended  Fundus: firm at level below umbilicus  Incision: pressure dressing in place, no strikethrough or overlying tenderness   Perineum: Deferred  Extremities: symmetric, no peripheral edema, calves nontender    Current Facility-Administered Medications   Medication Dose    ropivacaine 0.2 % (Naropin) injection      D5LR infusion      oxytocin (Pitocin) injection 10 Units  10 Units    lactated ringers infusion      ibuprofen (Motrin) tablet 800 mg  800 mg    Followed by    [START ON 6/2/2024] ibuprofen (Motrin) tablet 800 mg  800 mg    acetaminophen (Tylenol) tablet 1,000 mg  1,000 mg    Followed by    [START ON 6/3/2024] acetaminophen (Tylenol) tablet 1,000 mg  1,000 mg    oxyCODONE immediate-release (Roxicodone) tablet 5 mg  5 mg    oxyCODONE immediate release (Roxicodone) tablet 10 mg  10 mg    ondansetron (Zofran) syringe/vial injection 4 mg  4 mg    Or    ondansetron (Zofran ODT) dispertab 4 mg  4 mg    diphenhydrAMINE (Benadryl) tablet/capsule 25 mg  25 mg    Or    diphenhydrAMINE (Benadryl) injection 25 mg  25 mg    docusate sodium (Colace) capsule 100 mg  100 mg    tetanus-dipth-acell pertussis (Tdap) inj 0.5 mL  0.5 mL    oxytocin (Pitocin) infusion (for post delivery)  125 mL/hr    oxytocin (Pitocin) injection 10 Units  10 Units    miSOPROStol (Cytotec) tablet 800 mcg  800 mcg    methylergonovine (Methergine) injection 0.2 mg  0.2 mg    bisacodyl (Dulcolax) suppository 10 mg  10 mg    magnesium hydroxide (Milk Of Magnesia) suspension 30 mL  30 mL    prenatal plus vitamin (Stuartnatal 1+1) 27-1 MG tablet 1 Tablet  1 Tablet    simethicone (Mylicon) chewable tablet 125 mg  125 mg    calcium carbonate (Tums) chewable tab 1,000 mg  1,000 mg    LR  infusion      oxytocin (Pitocin) infusion (for induction)  0.5-20 william-units/min       Recent Labs     24  1450 24  0450   WBC 12.9* 20.4*   RBC 4.58 3.95*   HEMOGLOBIN 14.5 12.3   HEMATOCRIT 41.3 36.5*   MCV 90.2 92.4   MCH 31.7 31.1   MCHC 35.1 33.7   RDW 44.4 44.6   PLATELETCT 237 204   MPV 10.7 11.0       Activity/ Discharge Instructions::   Discharge to home  Pelvic Rest x 6 weeks  No heavy lifting x4 weeks  Call or come to ED for: heavy vaginal bleeding, fever >100.4, severe abdominal pain, severe headache, chest pain, shortness of breath,  N/V, incisional drainage, or other concerns.      Follow up:  1 week for incision check for  delivery.   Benjamin Singh M.D.  [address] [address]  [phone]    Go on 2024  at 11am for c section/incision check       Discharge Meds:   Current Outpatient Medications   Medication Sig Dispense Refill    acetaminophen (TYLENOL) 500 MG Tab Take 2 Tablets by mouth every 6 hours. 30 Tablet 0    docusate sodium 100 MG Cap Take 1 capsule by mouth 2 times a day as needed for Constipation. 30 Capsule 0    ibuprofen (MOTRIN) 800 MG Tab Take 1 Tablet by mouth every 8 hours. 30 Tablet 0    oxyCODONE immediate-release (ROXICODONE) 5 MG Tab Take 1 Tablet by mouth every four hours as needed for Severe Pain for up to 14 days. 20 Tablet 0       Ozzy Sanabria D.O.  PGY-1, [facility]

## 2024-05-31 NOTE — CARE PLAN
The patient is Stable - Low risk of patient condition declining or worsening    Shift Goals  Clinical Goals: pain control, vss, lochia wdl  Patient Goals: rest, pain control, breastfeed  Family Goals: support, update on plan of care    Progress made toward(s) clinical / shift goals: Plan of care and medications discussed and reviewed over with pt. Medicated pt oxycodone 10mg with pain rating of 8/10 when ambulating.Scheduled tylenol and motrin not working at this time. Pt reported of pain more on the right side of perineum. Pressure dressing is clean, dry, and intact. MOB doing skin to skin to promote bonding, breastfeeding and to stabilize infant's temp. Pt fundus is firm, bleeding is scant and vs wdl. Pt able to ambulate safely post surgery    Patient is not progressing towards the following goals:      Problem: Pain  Goal: Patient's pain will be alleviated or reduced to the patient’s comfort goal  Outcome: Progressing     Problem: Risk for Infection and Impaired Wound Healing  Goal: Patient's wound/surgical incision will decrease in size and heals properly  Outcome: Progressing     Problem: Knowledge Deficit - Postpartum  Goal: Patient will verbalize and demonstrate understanding of self and infant care  Outcome: Progressing     Problem: Psychosocial - Postpartum  Goal: Patient will verbalize and demonstrate effective bonding and parenting behavior  Outcome: Progressing     Problem: Altered Physiologic Condition  Goal: Patient physiologically stable as evidenced by normal lochia, palpable uterine involution and vitals within normal limits  Outcome: Progressing     Problem: Early Mobilization - Post Surgery  Goal: Early mobilization post surgery  Outcome: Progressing

## 2024-05-31 NOTE — DISCHARGE PLANNING
Meds-to-Beds: Discharge prescription orders listed below delivered to patient's bedside. LLOYD Nava notified. Patient counseled. Co-payment processed by pharmacy.       Current Outpatient Medications   Medication Sig Dispense Refill    acetaminophen (TYLENOL) 500 MG Tab Take 2 Tablets by mouth every 6 hours. 30 Tablet 0    docusate sodium 100 MG Cap Take 1 capsule by mouth 2 times a day as needed for Constipation. 30 Capsule 0    ibuprofen (MOTRIN) 800 MG Tab Take 1 Tablet by mouth every 8 hours. 30 Tablet 0    oxyCODONE immediate-release (ROXICODONE) 5 MG Tab Take 1 Tablet by mouth every four hours as needed for Severe Pain for up to 14 days. 20 Tablet 0      Daria Paul, PharmD

## 2024-06-01 ENCOUNTER — PHARMACY VISIT (OUTPATIENT)
Dept: PHARMACY | Facility: MEDICAL CENTER | Age: 24
End: 2024-06-01
Payer: COMMERCIAL

## 2024-06-01 VITALS
HEART RATE: 90 BPM | OXYGEN SATURATION: 97 % | HEIGHT: 69 IN | WEIGHT: 256 LBS | BODY MASS INDEX: 37.92 KG/M2 | SYSTOLIC BLOOD PRESSURE: 127 MMHG | TEMPERATURE: 97.7 F | DIASTOLIC BLOOD PRESSURE: 71 MMHG | RESPIRATION RATE: 18 BRPM

## 2024-06-01 PROBLEM — O99.820 GBS (GROUP B STREPTOCOCCUS CARRIER), +RV CULTURE, CURRENTLY PREGNANT: Status: RESOLVED | Noted: 2024-05-16 | Resolved: 2024-06-01

## 2024-06-01 PROCEDURE — 700102 HCHG RX REV CODE 250 W/ 637 OVERRIDE(OP)

## 2024-06-01 PROCEDURE — A9270 NON-COVERED ITEM OR SERVICE: HCPCS | Performed by: OBSTETRICS & GYNECOLOGY

## 2024-06-01 PROCEDURE — 700102 HCHG RX REV CODE 250 W/ 637 OVERRIDE(OP): Performed by: OBSTETRICS & GYNECOLOGY

## 2024-06-01 PROCEDURE — RXMED WILLOW AMBULATORY MEDICATION CHARGE

## 2024-06-01 PROCEDURE — A9270 NON-COVERED ITEM OR SERVICE: HCPCS

## 2024-06-01 RX ORDER — KETOCONAZOLE 20 MG/G
30 CREAM TOPICAL DAILY
Qty: 30 G | Refills: 1 | Status: SHIPPED | OUTPATIENT
Start: 2024-06-01 | End: 2024-06-15

## 2024-06-01 RX ORDER — BENZOCAINE/MENTHOL 6 MG-10 MG
LOZENGE MUCOUS MEMBRANE 2 TIMES DAILY
Status: DISCONTINUED | OUTPATIENT
Start: 2024-06-01 | End: 2024-06-01 | Stop reason: HOSPADM

## 2024-06-01 RX ORDER — BENZOCAINE/MENTHOL 6 MG-10 MG
1 LOZENGE MUCOUS MEMBRANE 2 TIMES DAILY
Qty: 28 G | Refills: 0 | Status: SHIPPED | OUTPATIENT
Start: 2024-06-01

## 2024-06-01 RX ADMIN — IBUPROFEN 800 MG: 800 TABLET, FILM COATED ORAL at 06:10

## 2024-06-01 RX ADMIN — HYDROCORTISONE: 0.01 CREAM TOPICAL at 11:06

## 2024-06-01 RX ADMIN — ACETAMINOPHEN 1000 MG: 500 TABLET, FILM COATED ORAL at 06:09

## 2024-06-01 ASSESSMENT — PAIN DESCRIPTION - PAIN TYPE
TYPE: SURGICAL PAIN

## 2024-06-01 NOTE — LACTATION NOTE
I spoke with Elizabeth and her support partner this morning about their lactation plan with baby Sonya. Baby has been in the nursery all night receiving phototherapy. MOB reports that they have brought Sonya to her every 3 hours for a breast feeding but baby has not latched for more than a minute or two. Formula supplements are taking place . Baby's weight loss is >9%.    I offered to assist Elizabeth with latch, pumping and hand expressing milk today. She has been using her [employer] personal breast pump. I advised her to consider using the hospital grade pump and renting one at time of discharge. Parents will consider that, rental information flyer provided.    They indicated that they will likely discharge home today regardless if baby goes home.White board updated and they were encouraged to call for me as needed.

## 2024-06-01 NOTE — DISCHARGE SUMMARY
Discharge Summary:      Elizabeth Delgado    Admit Date:   2024  Discharge Date:  2024     Admitting diagnosis:  Indication for care in labor or delivery [O75.9]  Discharge Diagnosis: Status post  for failure to progress.  Pregnancy Complications: group B strep (treated)  Tubal Ligation:  no        History:  History reviewed. No pertinent past medical history.  OB History    Para Term  AB Living   1 1 1 0 0 1   SAB IAB Ectopic Molar Multiple Live Births   0 0 0 0 0 1      # Outcome Date GA Lbr Emiliano/2nd Weight Sex Delivery Anes PTL Lv   1 Term 24 38w1d  2.86 kg (6 lb 4.9 oz) F CS-LTranv EPI, Spinal N JULIET      Complications: Failure to Progress in First Stage        Patient has no known allergies.  Patient Active Problem List    Diagnosis Date Noted    S/P  section 2024        Hospital Course:   23 y.o. , now para 1, was admitted with the above mentioned diagnosis, underwent  labor augmentation for SROM ,  for failure to progress. Patient postpartum course was unremarkable, with progressive advancement in diet , ambulation and toleration of oral analgesia. Patient does have concerns for inguinal rash that is painful, she has been keeping the area dry and clean though it is not improving. Otherwise without complaints today and desires discharge.      Vitals:    24 1800 24 0551 24 1842 24 0600   BP: 123/68 107/67 124/76 127/71   Pulse: 88 83 96 90   Resp: 18 18 17 18   Temp: 36.7 °C (98 °F) 36.2 °C (97.2 °F) 37.1 °C (98.7 °F) 36.5 °C (97.7 °F)   TempSrc: Temporal Temporal Temporal Temporal   SpO2: 97% 97% 96% 97%   Weight:       Height:           Current Facility-Administered Medications   Medication Dose    hydrocortisone 1 % cream      ropivacaine 0.2 % (Naropin) injection      D5LR infusion      oxytocin (Pitocin) injection 10 Units  10 Units    lactated ringers infusion      ibuprofen (Motrin) tablet 800 mg  800 mg     Followed by    [START ON 6/2/2024] ibuprofen (Motrin) tablet 800 mg  800 mg    acetaminophen (Tylenol) tablet 1,000 mg  1,000 mg    Followed by    [START ON 6/3/2024] acetaminophen (Tylenol) tablet 1,000 mg  1,000 mg    oxyCODONE immediate-release (Roxicodone) tablet 5 mg  5 mg    oxyCODONE immediate release (Roxicodone) tablet 10 mg  10 mg    ondansetron (Zofran) syringe/vial injection 4 mg  4 mg    Or    ondansetron (Zofran ODT) dispertab 4 mg  4 mg    diphenhydrAMINE (Benadryl) tablet/capsule 25 mg  25 mg    Or    diphenhydrAMINE (Benadryl) injection 25 mg  25 mg    docusate sodium (Colace) capsule 100 mg  100 mg    tetanus-dipth-acell pertussis (Tdap) inj 0.5 mL  0.5 mL    oxytocin (Pitocin) infusion (for post delivery)  125 mL/hr    oxytocin (Pitocin) injection 10 Units  10 Units    miSOPROStol (Cytotec) tablet 800 mcg  800 mcg    methylergonovine (Methergine) injection 0.2 mg  0.2 mg    bisacodyl (Dulcolax) suppository 10 mg  10 mg    magnesium hydroxide (Milk Of Magnesia) suspension 30 mL  30 mL    prenatal plus vitamin (Stuartnatal 1+1) 27-1 MG tablet 1 Tablet  1 Tablet    simethicone (Mylicon) chewable tablet 125 mg  125 mg    calcium carbonate (Tums) chewable tab 1,000 mg  1,000 mg    LR infusion      oxytocin (Pitocin) infusion (for induction)  0.5-20 william-units/min       Exam:  Breast Exam: negative  Abdomen: Abdomen soft, non-tender. BS normal. No masses,  No organomegaly  Fundus Non Tender: yes  Incision: dry and intact; multiple blisters present in inguinal area bilaterally  Perineum: perineum intact  Extremity: extremities, peripheral pulses and reflexes normal     Labs:  Recent Labs     05/30/24  0450   WBC 20.4*   RBC 3.95*   HEMOGLOBIN 12.3   HEMATOCRIT 36.5*   MCV 92.4   MCH 31.1   MCHC 33.7   RDW 44.6   PLATELETCT 204   MPV 11.0        Activity:   Discharge to home  Pelvic Rest x 6 weeks    Assessment:  Normal postpartum course  Discussed reasons to return to hospital and warning signs in  postpartum period  Staples to be removed today prior to discharge and replaced with steristrips  Desires Nexplanon for PPC at PP visit  Inguinal rash assessed by MD Lynn, who recommends hydrocortisone cream. Will reassess this afternoon and likely discharge home      Follow [facility]: [facility] in 1 week for incision check then 5-6 weeks for routine PP care     Discharge Meds:   Current Outpatient Medications   Medication Sig Dispense Refill    hydrocortisone 1 % Cream Apply 1 Application topically 2 times a day. 28.4 g 0    acetaminophen (TYLENOL) 500 MG Tab Take 2 Tablets by mouth every 6 hours. 30 Tablet 0    docusate sodium 100 MG Cap Take 1 capsule by mouth 2 times a day as needed for Constipation. 30 Capsule 0    ibuprofen (MOTRIN) 800 MG Tab Take 1 Tablet by mouth every 8 hours. 30 Tablet 0    oxyCODONE immediate-release (ROXICODONE) 5 MG Tab Take 1 Tablet by mouth every four hours as needed for Severe Pain for up to 14 days. 20 Tablet 0       Radha Haile C.N.M.

## 2024-06-01 NOTE — PROGRESS NOTES
Assessment completed. Fundus firm, lochia light. Abd. Incisions open to air. Plan of care reviewed. Denies pain at this time, will call if pain med intervention needed. Patient stated not ambulated too much and noticing bilateral leg swelling (no pitting and pulses felt). SCD were placed back on patient. Call light within reach, bed at lowest position, and shows no signs of distress at this time.

## 2024-06-01 NOTE — PROGRESS NOTES
0700-- Received report from Renetta ROBLEDO RN, Infant in NBN as a LVL II receiving photothearpy.  Pt resting in bed. Discussed pain management for the day.  No further needs at the time.  Call light within reach, bed locked and in lowest position.  Rounding in place.    0830-- Assessment completed, VSS, Pt declines PRN pain medication at this time.  Discussed plan of care for the day that pt is comfortable with.  All questions answered at this time.      1330- Discharge teaching reviewed with pt, all questions answered.  Pt has all written information on self and infant care, including prescriptions,  screening slip and information, and follow-up instructions.  Bands verified, cuddles removed.  Pt will call when she is ready for car seat check.     1618- Infant placed in car seat by POB and checked by this RN.  Hospital escort provided.  Pt discharged home in stable condition with POB.

## 2024-06-06 ENCOUNTER — GYNECOLOGY VISIT (OUTPATIENT)
Dept: OBGYN | Facility: CLINIC | Age: 24
End: 2024-06-06
Payer: COMMERCIAL

## 2024-06-06 VITALS
BODY MASS INDEX: 36.79 KG/M2 | DIASTOLIC BLOOD PRESSURE: 66 MMHG | HEIGHT: 69 IN | WEIGHT: 248.4 LBS | SYSTOLIC BLOOD PRESSURE: 119 MMHG

## 2024-06-06 DIAGNOSIS — L30.9 DERMATITIS: ICD-10-CM

## 2024-06-06 PROCEDURE — 99024 POSTOP FOLLOW-UP VISIT: CPT | Performed by: OBSTETRICS & GYNECOLOGY

## 2024-06-06 PROCEDURE — 3074F SYST BP LT 130 MM HG: CPT | Performed by: OBSTETRICS & GYNECOLOGY

## 2024-06-06 PROCEDURE — 3078F DIAST BP <80 MM HG: CPT | Performed by: OBSTETRICS & GYNECOLOGY

## 2024-06-06 RX ORDER — BETAMETHASONE DIPROPIONATE 0.05 %
1 OINTMENT (GRAM) TOPICAL 2 TIMES DAILY
Qty: 60 G | Refills: 1 | Status: SHIPPED | OUTPATIENT
Start: 2024-06-06

## 2024-06-06 RX ORDER — FLUCONAZOLE 200 MG/1
200 TABLET ORAL DAILY
Qty: 5 TABLET | Refills: 0 | Status: SHIPPED | OUTPATIENT
Start: 2024-06-06 | End: 2024-06-11

## 2024-06-06 ASSESSMENT — FIBROSIS 4 INDEX: FIB4 SCORE: 0.56

## 2024-06-06 NOTE — PROGRESS NOTES
Pt is here for a post op check  Delivered 5/29524 C/S. Staples placed.  Pt states she has a rash around her groin area/upper inner thigh, using hydrocortisone an ketoconazole to treat this rash. Still inflamed. Incision site is healing fine.   Phone/pharmacy verified

## 2024-06-06 NOTE — PROGRESS NOTES
"POD # 7 S/P       SUBJECTIVE: Pain well controlled.  The patient has had a significant rash since approximately 2 days after her .  She reports that the rash is worsened progressively despite the use of hydrocortisone and an antifungal.  She denies significant pain/blistering and has mild itching.  Minimal vaginal bleeding.  She is bottle feeding.  Tolerating regular diet.  Passing flatus.  No BM.  Ambulating without lightheadedness or dizziness. No depression or anxiety.    OBJECTIVE:  /66 (BP Location: Right arm, Patient Position: Sitting, BP Cuff Size: Large adult)   Ht 5' 9\"   Wt 248 lb 6.4 oz      GENERAL: Alert, in no apparent distress  PSYCHIATRIC: Appropriate affect, intact insight and judgement.  ABDOMEN: Soft, nontender, nondistended.  No rebound or guarding.  The incision is intact with probable small seroma under the mid incision.  No inflammatory changes are noted there.  The patient does have significant erythema with healing blisters under the areas of the surgical prep and in the creases of her groin.  EXTREMITIES: Minimal edema, calves NT      ASSESSMENT: POD # 7 S/P    presents for a wound check after .  The patient is doing well with her  scar however she has significant dermatitis from her surgery prep.  I will change her topical steroid to betamethasone and apply it to twice daily.  Because of the increased inflammation in the inguinal crease, I will also give her a course of Diflucan.      PLAN: Increased to twice daily topical steroid.  Will also provide a prescription for Diflucan based on the location of her inflammation.  Follow-up 5 weeks for her normal postpartum visit or as needed if her symptoms worsen.    "

## 2024-07-18 ENCOUNTER — POST PARTUM (OUTPATIENT)
Dept: OBGYN | Facility: CLINIC | Age: 24
End: 2024-07-18
Payer: COMMERCIAL

## 2024-07-18 VITALS — DIASTOLIC BLOOD PRESSURE: 76 MMHG | SYSTOLIC BLOOD PRESSURE: 104 MMHG | BODY MASS INDEX: 38.48 KG/M2 | WEIGHT: 260.6 LBS

## 2024-07-18 DIAGNOSIS — Z30.015 ENCOUNTER FOR INITIAL PRESCRIPTION OF VAGINAL RING HORMONAL CONTRACEPTIVE: ICD-10-CM

## 2024-07-18 PROCEDURE — 3078F DIAST BP <80 MM HG: CPT | Performed by: OBSTETRICS & GYNECOLOGY

## 2024-07-18 PROCEDURE — 3074F SYST BP LT 130 MM HG: CPT | Performed by: OBSTETRICS & GYNECOLOGY

## 2024-07-18 PROCEDURE — 0503F POSTPARTUM CARE VISIT: CPT | Performed by: OBSTETRICS & GYNECOLOGY

## 2024-07-18 RX ORDER — ETONOGESTREL AND ETHINYL ESTRADIOL VAGINAL RING .015; .12 MG/D; MG/D
RING VAGINAL
Qty: 1 EACH | Refills: 6 | Status: SHIPPED | OUTPATIENT
Start: 2024-07-18

## 2024-07-18 ASSESSMENT — EDINBURGH POSTNATAL DEPRESSION SCALE (EPDS)
I HAVE BEEN ABLE TO LAUGH AND SEE THE FUNNY SIDE OF THINGS: AS MUCH AS I ALWAYS COULD
THE THOUGHT OF HARMING MYSELF HAS OCCURRED TO ME: NEVER
I HAVE BEEN SO UNHAPPY THAT I HAVE HAD DIFFICULTY SLEEPING: NOT AT ALL
I HAVE FELT SAD OR MISERABLE: NO, NOT AT ALL
THINGS HAVE BEEN GETTING ON TOP OF ME: NO, I HAVE BEEN COPING AS WELL AS EVER
I HAVE LOOKED FORWARD WITH ENJOYMENT TO THINGS: AS MUCH AS I EVER DID
TOTAL SCORE: 3
I HAVE BLAMED MYSELF UNNECESSARILY WHEN THINGS WENT WRONG: NO, NEVER
I HAVE BEEN ANXIOUS OR WORRIED FOR NO GOOD REASON: YES, VERY OFTEN
I HAVE BEEN SO UNHAPPY THAT I HAVE BEEN CRYING: NO, NEVER
I HAVE FELT SCARED OR PANICKY FOR NO GOOD REASON: NO, NOT AT ALL

## 2024-07-18 ASSESSMENT — FIBROSIS 4 INDEX: FIB4 SCORE: 0.56

## 2024-07-21 ENCOUNTER — PATIENT MESSAGE (OUTPATIENT)
Dept: OBGYN | Facility: CLINIC | Age: 24
End: 2024-07-21
Payer: COMMERCIAL

## 2025-03-31 ENCOUNTER — APPOINTMENT (OUTPATIENT)
Dept: MEDICAL GROUP | Facility: MEDICAL CENTER | Age: 25
End: 2025-03-31
Payer: COMMERCIAL

## 2025-03-31 VITALS
RESPIRATION RATE: 16 BRPM | SYSTOLIC BLOOD PRESSURE: 108 MMHG | BODY MASS INDEX: 38.47 KG/M2 | OXYGEN SATURATION: 92 % | HEART RATE: 102 BPM | HEIGHT: 69 IN | TEMPERATURE: 97.9 F | DIASTOLIC BLOOD PRESSURE: 62 MMHG | WEIGHT: 259.7 LBS

## 2025-03-31 DIAGNOSIS — Z98.891 HISTORY OF CESAREAN SECTION: ICD-10-CM

## 2025-03-31 DIAGNOSIS — N60.01 CYST OF RIGHT BREAST: ICD-10-CM

## 2025-03-31 DIAGNOSIS — F41.9 ANXIETY: ICD-10-CM

## 2025-03-31 DIAGNOSIS — Z30.015 ENCOUNTER FOR INITIAL PRESCRIPTION OF VAGINAL RING HORMONAL CONTRACEPTIVE: ICD-10-CM

## 2025-03-31 LAB
POCT INT CON NEG: NEGATIVE
POCT INT CON POS: POSITIVE
POCT URINE PREGNANCY TEST: NEGATIVE

## 2025-03-31 RX ORDER — ETONOGESTREL AND ETHINYL ESTRADIOL VAGINAL RING .015; .12 MG/D; MG/D
RING VAGINAL
Qty: 1 EACH | Refills: 6 | Status: SHIPPED | OUTPATIENT
Start: 2025-03-31

## 2025-03-31 ASSESSMENT — PATIENT HEALTH QUESTIONNAIRE - PHQ9: CLINICAL INTERPRETATION OF PHQ2 SCORE: 0

## 2025-03-31 ASSESSMENT — FIBROSIS 4 INDEX: FIB4 SCORE: 0.59

## 2025-03-31 NOTE — LETTER
"March 31, 2025        Elizabeth Delgado  [address]  [facility] [address]        Elizabeth Delgado a current patient under my direct medical care at this clinic. It has come to my attention that she finds it therapeutically beneficial to have her dog \"Spooky\" with her at all times.  Her dog \"Spooky\" helps to provide emotional support to her and finds her company beneficial in improving her overall physical health and emotional wellbeing.     If you have any questions or concerns, please don't hesitate to call.        Sincerely,        VIMAL Arguelles.    Electronically Signed     "
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
## 2025-04-28 ENCOUNTER — HOSPITAL ENCOUNTER (OUTPATIENT)
Dept: RADIOLOGY | Facility: MEDICAL CENTER | Age: 25
End: 2025-04-28
Attending: NURSE PRACTITIONER
Payer: COMMERCIAL

## 2025-04-28 DIAGNOSIS — N60.01 CYST OF RIGHT BREAST: ICD-10-CM

## 2025-04-28 PROCEDURE — 76642 ULTRASOUND BREAST LIMITED: CPT | Mod: RT

## (undated) DEVICE — GLOVE, BIOGEL ECLIPSE, SZ 7.0, PF LTX (50/BX)

## (undated) DEVICE — TAPE CLOTH MEDIPORE 6 INCH - (12RL/CA)

## (undated) DEVICE — CATHETER IV NON-SAFETY 18 GA X 1 1/4 (50/BX 4BX/CA)

## (undated) DEVICE — GLOVE BIOGEL SZ 7 SURGICAL PF LTX - (50PR/BX 4BX/CA)

## (undated) DEVICE — PAD LAP STERILE 18 X 18 - (5/PK 40PK/CA)

## (undated) DEVICE — WATER IRRIGATION STERILE 1000ML (12EA/CA)

## (undated) DEVICE — ELECTRODE DUAL RETURN W/ CORD - (50/PK)

## (undated) DEVICE — BAG SPONGE COUNT 10.25 X 32 - BLUE (250/CA)

## (undated) DEVICE — SODIUM CHL IRRIGATION 0.9% 1000ML (12EA/CA)

## (undated) DEVICE — TRAY SPINAL ANESTHESIA NON-SAFETY (10/CA)

## (undated) DEVICE — KIT  I.V. START (100EA/CA)

## (undated) DEVICE — DRESSING NON-ADHERING 8 X 3 - (50/BX)

## (undated) DEVICE — PACK C-SECTION (2EA/CA)

## (undated) DEVICE — STAPLER SKIN DISP - (6/BX 10BX/CA) VISISTAT

## (undated) DEVICE — SPONGE GAUZESTER 4 X 4 4PLY - (128PK/CA)

## (undated) DEVICE — HEAD HOLDER JUNIOR/ADULT

## (undated) DEVICE — SOLUTION PLASMA-LYTE PH 7.4 INJ 1000ML  (14EA/CA)

## (undated) DEVICE — PACK ROOM TURNOVER L&D (12/CA)

## (undated) DEVICE — TUBING CLEARLINK DUO-VENT - C-FLO (48EA/CA)

## (undated) DEVICE — SUTURE 0 VICRYL PLUS CT-1 - 36 INCH (36/BX)

## (undated) DEVICE — PENCIL ELECTSURG 10FT HLSTR - WITH BLADE (50EA/CA)

## (undated) DEVICE — SET EXTENSION WITH 2 PORTS (48EA/CA) ***PART #2C8610 IS A SUBSTITUTE*****